# Patient Record
Sex: FEMALE | Race: WHITE | Employment: FULL TIME | URBAN - METROPOLITAN AREA
[De-identification: names, ages, dates, MRNs, and addresses within clinical notes are randomized per-mention and may not be internally consistent; named-entity substitution may affect disease eponyms.]

---

## 2019-04-13 ENCOUNTER — HOSPITAL ENCOUNTER (EMERGENCY)
Age: 49
Discharge: HOME | End: 2019-04-13
Payer: COMMERCIAL

## 2019-04-13 VITALS
HEART RATE: 77 BPM | WEIGHT: 180 LBS | DIASTOLIC BLOOD PRESSURE: 79 MMHG | RESPIRATION RATE: 14 BRPM | SYSTOLIC BLOOD PRESSURE: 114 MMHG | BODY MASS INDEX: 24.41 KG/M2 | OXYGEN SATURATION: 97 % | TEMPERATURE: 98.7 F

## 2019-04-13 DIAGNOSIS — J06.9 ACUTE UPPER RESPIRATORY INFECTION: Primary | ICD-10-CM

## 2019-04-13 PROCEDURE — 99212 OFFICE O/P EST SF 10 MIN: CPT

## 2019-04-13 RX ORDER — AMOXICILLIN AND CLAVULANATE POTASSIUM 875; 125 MG/1; MG/1
1 TABLET, FILM COATED ORAL 2 TIMES DAILY
Qty: 20 TABLET | Refills: 0 | Status: SHIPPED | OUTPATIENT
Start: 2019-04-13 | End: 2019-04-23

## 2019-04-13 RX ORDER — BENZONATATE 200 MG/1
200 CAPSULE ORAL 3 TIMES DAILY PRN
Qty: 21 CAPSULE | Refills: 0 | Status: SHIPPED | OUTPATIENT
Start: 2019-04-13 | End: 2019-05-04

## 2019-04-13 NOTE — ED PROVIDER NOTES
Department of Emergency Medicine   [address] [place] [address]  Provider Note  Admit Date/RoomTime: 4/13/2019  6:38 PM  Room: 04/04  Chief Complaint   Cough (productive cough); Nasal Congestion (PND, runny); and Headache    History of Present Illness   Source of history provided by:  patient. History/Exam Limitations: none. Darrell Barrow is a 54 y.o. old female who has a past medical history of:   Past Medical History:   Diagnosis Date    Diabetes mellitus ([address]    presents to the urgent care center by private vehicle, for runny nose, congestion, cough and sinus pressure, which began 1 week(s) prior to arrival.  Since onset the symptoms have been persistent. She said she sees an ENT she's had sinus surgery and this time is flared up she has pressure over her cheeks and forehead she has postnasal drip she has a cough. She's been taking over-the-counter Sudafed and allergy medicine and nothings relieving her symptoms. ROS    Pertinent positives and negatives are stated within HPI, all other systems reviewed and are negative. Past Surgical History:   Procedure Laterality Date    HYSTERECTOMY     Social History:  reports that she has never smoked. She does not have any smokeless tobacco history on file. She reports that she drinks alcohol. She reports that she does not use drugs. Family History: family history is not on file. Allergies: Patient has no known allergies. Physical Exam            ED Triage Vitals [04/13/19 1839]   BP Temp Temp src Pulse Resp SpO2 Height Weight   114/79 98.7 °F (37.1 °C) -- 77 14 97 % -- 180 lb (81.6 kg)      Oxygen Saturation Interpretation: Normal.    Constitutional:  Alert, development consistent with age. Ears:  External Ears: Bilateral normal.               TM's & External Canals: normal appearance, normal TMs bilaterally. Nose:   There is no discharge, swelling or lesions noted. Sinuses: mild Bilateral maxillary sinus tenderness. no Bilateral frontal sinus tenderness. Mouth:  normal tongue and buccal mucosa. Throat: no erythema or exudates noted. Teeth and gums normal..  Airway Patent. Neck/Lymphatics:  Neck Supple. There is no  preauricular, anterior cervical and posterior cervical node tenderness. Respiratory:   Breath sounds: Bilateral normal.  Lung sounds: normal.   CV:  Regular rate and rhythm, normal heart sounds, without pathological murmurs, ectopy, gallops, or rubs. GI:  Abdomen Soft, nontender, good bowel sounds. No firm or pulsatile mass. Integument:  Normal turgor. Warm, dry, without visible rash. Neurological:  Oriented. Motor functions intact. Lab / Imaging Results   (All laboratory and radiology results have been personally reviewed by myself)  Labs:  No results found for this visit on 04/13/19. Imaging: All Radiology results interpreted by Radiologist unless otherwise noted. No orders to display     ED Course / Medical Decision Making   Medications - No data to display       MDM:   Patient has been sick for one week with sinus congestion and drainage. Not over-the-counter medicines aren't helping she has a history of sinus infections and also sinus surgery. I did put her on Augmentin she said Tessalon does help her cough so I did order that for her she'll continue to take Sudafed and over-the-counter decongestants as needed and she has an appointment with her ENT coming up on Monday       She is not hypoxic. Patient is well appearing, non toxic and appropriate for outpatient management. Plan of Care: Normal progression of disease discussed. All questions answered. Explained the rationale for symptomatic treatment Instruction provided in the use of fluids, vaporizer, acetaminophen, and other OTC medication for symptom control. Extra fluids  Analgesics as needed, dose reviewed. Follow up as needed should symptoms fail to improve.      Counseling:   I have  spoken with the patient and discussed todays results, in addition to providing specific details for the plan of care and counseling regarding the diagnosis and prognosis. Questions are answered at this time and they are agreeable with the plan. Assessment      1. Acute upper respiratory infection      Plan   Discharge to home and advised to contact Emery Singer, [address] [facility] [address] [place] [address] [phone]      As needed   Patient condition is good    New Medications     New Prescriptions    AMOXICILLIN-CLAVULANATE (AUGMENTIN) 875-125 MG PER TABLET    Take 1 tablet by mouth 2 times daily for 10 days    BENZONATATE (TESSALON) 200 MG CAPSULE    Take 1 capsule by mouth 3 times daily as needed for Cough     Electronically signed by SHARLENE Bull CNP   DD: 4/13/19  **This report was transcribed using voice recognition software. Every effort was made to ensure accuracy; however, inadvertent computerized transcription errors may be present.   END OF ED PROVIDER NOTE     SHARLENE Bull CNP  04/13/19 [id number]

## 2019-07-28 ENCOUNTER — HOSPITAL ENCOUNTER (EMERGENCY)
Age: 49
Discharge: HOME | End: 2019-07-28
Payer: COMMERCIAL

## 2019-07-28 VITALS
TEMPERATURE: 97.7 F | BODY MASS INDEX: 24.38 KG/M2 | HEART RATE: 69 BPM | RESPIRATION RATE: 14 BRPM | OXYGEN SATURATION: 98 % | DIASTOLIC BLOOD PRESSURE: 74 MMHG | HEIGHT: 72 IN | WEIGHT: 180 LBS | SYSTOLIC BLOOD PRESSURE: 110 MMHG

## 2019-07-28 DIAGNOSIS — N30.00 ACUTE CYSTITIS WITHOUT HEMATURIA: Primary | ICD-10-CM

## 2019-07-28 LAB
BACTERIA: ABNORMAL /HPF
BILIRUBIN URINE: NEGATIVE
BLOOD, URINE: NEGATIVE
CLARITY: ABNORMAL
COLOR: ABNORMAL
GLUCOSE URINE: NEGATIVE MG/DL
KETONES, URINE: NEGATIVE MG/DL
LEUKOCYTE ESTERASE, URINE: NEGATIVE
NITRITE, URINE: POSITIVE
PH UA: 5.5 (ref 5–9)
PROTEIN UA: ABNORMAL MG/DL
RBC UA: ABNORMAL /HPF (ref 0–2)
SPECIFIC GRAVITY UA: 1.01 (ref 1–1.03)
UROBILINOGEN, URINE: 1 E.U./DL
WBC UA: ABNORMAL /HPF (ref 0–5)

## 2019-07-28 PROCEDURE — 87186 SC STD MICRODIL/AGAR DIL: CPT

## 2019-07-28 PROCEDURE — 81001 URINALYSIS AUTO W/SCOPE: CPT

## 2019-07-28 PROCEDURE — 87077 CULTURE AEROBIC IDENTIFY: CPT

## 2019-07-28 PROCEDURE — 87088 URINE BACTERIA CULTURE: CPT

## 2019-07-28 PROCEDURE — 99212 OFFICE O/P EST SF 10 MIN: CPT

## 2019-07-28 RX ORDER — PHENAZOPYRIDINE HYDROCHLORIDE 200 MG/1
200 TABLET, FILM COATED ORAL 3 TIMES DAILY PRN
Qty: 6 TABLET | Refills: 0 | Status: SHIPPED | OUTPATIENT
Start: 2019-07-28 | End: 2019-07-30

## 2019-07-28 RX ORDER — CEPHALEXIN 500 MG/1
500 CAPSULE ORAL 3 TIMES DAILY
Qty: 21 CAPSULE | Refills: 0 | Status: SHIPPED | OUTPATIENT
Start: 2019-07-28 | End: 2019-08-04

## 2019-07-28 RX ORDER — GLIPIZIDE 5 MG/1
5 TABLET ORAL
COMMUNITY

## 2019-07-28 ASSESSMENT — PAIN SCALES - GENERAL: PAINLEVEL_OUTOF10: 7

## 2019-07-28 ASSESSMENT — PAIN DESCRIPTION - DESCRIPTORS: DESCRIPTORS: BURNING

## 2019-07-28 ASSESSMENT — PAIN DESCRIPTION - LOCATION: LOCATION: PERINEUM

## 2019-07-30 LAB
ORGANISM: ABNORMAL
URINE CULTURE, ROUTINE: ABNORMAL
URINE CULTURE, ROUTINE: ABNORMAL

## 2019-09-22 ENCOUNTER — HOSPITAL ENCOUNTER (EMERGENCY)
Age: 49
Discharge: HOME | End: 2019-09-22
Payer: COMMERCIAL

## 2019-09-22 VITALS
BODY MASS INDEX: 24.41 KG/M2 | DIASTOLIC BLOOD PRESSURE: 73 MMHG | OXYGEN SATURATION: 99 % | WEIGHT: 180 LBS | SYSTOLIC BLOOD PRESSURE: 133 MMHG | TEMPERATURE: 98.2 F | HEART RATE: 76 BPM | RESPIRATION RATE: 16 BRPM

## 2019-09-22 DIAGNOSIS — J02.9 EXUDATIVE PHARYNGITIS: Primary | ICD-10-CM

## 2019-09-22 PROCEDURE — 99212 OFFICE O/P EST SF 10 MIN: CPT

## 2019-09-22 RX ORDER — HYDROCODONE BITARTRATE AND ACETAMINOPHEN 5; 325 MG/1; MG/1
1 TABLET ORAL EVERY 6 HOURS PRN
COMMUNITY
End: 2019-12-09

## 2019-09-22 RX ORDER — AMOXICILLIN 875 MG/1
875 TABLET, COATED ORAL 2 TIMES DAILY
Qty: 20 TABLET | Refills: 0 | Status: SHIPPED | OUTPATIENT
Start: 2019-09-22 | End: 2019-10-02

## 2019-09-22 RX ORDER — IBUPROFEN AND FAMOTIDINE 26.6; 8 MG/1; MG/1
TABLET, FILM COATED ORAL
COMMUNITY
End: 2021-08-08

## 2019-12-09 ENCOUNTER — HOSPITAL ENCOUNTER (EMERGENCY)
Age: 49
Discharge: HOME | End: 2019-12-09
Payer: COMMERCIAL

## 2019-12-09 VITALS
RESPIRATION RATE: 18 BRPM | HEART RATE: 100 BPM | TEMPERATURE: 99.2 F | OXYGEN SATURATION: 95 % | BODY MASS INDEX: 24.38 KG/M2 | DIASTOLIC BLOOD PRESSURE: 75 MMHG | WEIGHT: 180 LBS | HEIGHT: 72 IN | SYSTOLIC BLOOD PRESSURE: 100 MMHG

## 2019-12-09 DIAGNOSIS — R68.89 FLU-LIKE SYMPTOMS: Primary | ICD-10-CM

## 2019-12-09 LAB — STREP GRP A PCR: NEGATIVE

## 2019-12-09 PROCEDURE — 99212 OFFICE O/P EST SF 10 MIN: CPT

## 2019-12-09 PROCEDURE — 87880 STREP A ASSAY W/OPTIC: CPT

## 2019-12-09 RX ORDER — OSELTAMIVIR PHOSPHATE 75 MG/1
75 CAPSULE ORAL 2 TIMES DAILY
Qty: 10 CAPSULE | Refills: 0 | Status: SHIPPED | OUTPATIENT
Start: 2019-12-09 | End: 2019-12-14

## 2019-12-09 ASSESSMENT — PAIN SCALES - GENERAL: PAINLEVEL_OUTOF10: 8

## 2019-12-09 ASSESSMENT — PAIN DESCRIPTION - DESCRIPTORS: DESCRIPTORS: ACHING

## 2019-12-09 ASSESSMENT — PAIN DESCRIPTION - PAIN TYPE: TYPE: ACUTE PAIN

## 2019-12-09 ASSESSMENT — PAIN DESCRIPTION - PROGRESSION: CLINICAL_PROGRESSION: GRADUALLY WORSENING

## 2019-12-09 ASSESSMENT — PAIN DESCRIPTION - LOCATION: LOCATION: GENERALIZED

## 2019-12-09 ASSESSMENT — PAIN DESCRIPTION - FREQUENCY: FREQUENCY: CONTINUOUS

## 2019-12-09 ASSESSMENT — PAIN DESCRIPTION - ONSET: ONSET: ON-GOING

## 2019-12-17 ENCOUNTER — HOSPITAL ENCOUNTER (EMERGENCY)
Age: 49
Discharge: HOME | End: 2019-12-17
Payer: COMMERCIAL

## 2019-12-17 ENCOUNTER — APPOINTMENT (OUTPATIENT)
Dept: GENERAL RADIOLOGY | Age: 49
End: 2019-12-17
Payer: COMMERCIAL

## 2019-12-17 VITALS
RESPIRATION RATE: 16 BRPM | DIASTOLIC BLOOD PRESSURE: 84 MMHG | BODY MASS INDEX: 24.41 KG/M2 | WEIGHT: 180 LBS | SYSTOLIC BLOOD PRESSURE: 135 MMHG | TEMPERATURE: 98.2 F | OXYGEN SATURATION: 98 % | HEART RATE: 79 BPM

## 2019-12-17 DIAGNOSIS — J18.9 PNEUMONIA DUE TO ORGANISM: Primary | ICD-10-CM

## 2019-12-17 PROCEDURE — 71046 X-RAY EXAM CHEST 2 VIEWS: CPT

## 2019-12-17 PROCEDURE — 99212 OFFICE O/P EST SF 10 MIN: CPT

## 2019-12-17 RX ORDER — CEFDINIR 300 MG/1
300 CAPSULE ORAL 2 TIMES DAILY
Qty: 20 CAPSULE | Refills: 0 | Status: SHIPPED | OUTPATIENT
Start: 2019-12-17 | End: 2019-12-27

## 2019-12-17 RX ORDER — BENZONATATE 200 MG/1
200 CAPSULE ORAL 3 TIMES DAILY PRN
Qty: 21 CAPSULE | Refills: 0 | Status: SHIPPED | OUTPATIENT
Start: 2019-12-17 | End: 2019-12-24

## 2019-12-17 RX ORDER — AZITHROMYCIN 250 MG/1
TABLET, FILM COATED ORAL
Qty: 6 TABLET | Refills: 0 | Status: SHIPPED | OUTPATIENT
Start: 2019-12-17 | End: 2019-12-27

## 2020-09-07 ENCOUNTER — HOSPITAL ENCOUNTER (OUTPATIENT)
Age: 50
Discharge: HOME | End: 2020-09-07
Payer: COMMERCIAL

## 2020-09-07 LAB
ALBUMIN SERPL-MCNC: 4.4 G/DL (ref 3.5–5.2)
ALP BLD-CCNC: 72 U/L (ref 35–104)
ALT SERPL-CCNC: 9 U/L (ref 0–32)
ANION GAP SERPL CALCULATED.3IONS-SCNC: 11 MMOL/L (ref 7–16)
AST SERPL-CCNC: 15 U/L (ref 0–31)
BASOPHILS ABSOLUTE: 0.04 E9/L (ref 0–0.2)
BASOPHILS RELATIVE PERCENT: 0.9 % (ref 0–2)
BILIRUB SERPL-MCNC: 0.9 MG/DL (ref 0–1.2)
BUN BLDV-MCNC: 13 MG/DL (ref 6–20)
CALCIUM SERPL-MCNC: 9.3 MG/DL (ref 8.6–10.2)
CHLORIDE BLD-SCNC: 102 MMOL/L (ref 98–107)
CHOLESTEROL, FASTING: 171 MG/DL (ref 0–199)
CO2: 27 MMOL/L (ref 22–29)
CREAT SERPL-MCNC: 0.9 MG/DL (ref 0.5–1)
CREATININE URINE: 34 MG/DL (ref 29–226)
EOSINOPHILS ABSOLUTE: 0.1 E9/L (ref 0.05–0.5)
EOSINOPHILS RELATIVE PERCENT: 2.3 % (ref 0–6)
GFR AFRICAN AMERICAN: >60
GFR NON-AFRICAN AMERICAN: >60 ML/MIN/1.73
GLUCOSE BLD-MCNC: 140 MG/DL (ref 74–99)
HCT VFR BLD CALC: 38.2 % (ref 34–48)
HDLC SERPL-MCNC: 60 MG/DL
HEMOGLOBIN: 12.9 G/DL (ref 11.5–15.5)
IMMATURE GRANULOCYTES #: 0.01 E9/L
IMMATURE GRANULOCYTES %: 0.2 % (ref 0–5)
LDL CHOLESTEROL CALCULATED: 90 MG/DL (ref 0–99)
LYMPHOCYTES ABSOLUTE: 1.38 E9/L (ref 1.5–4)
LYMPHOCYTES RELATIVE PERCENT: 31.7 % (ref 20–42)
MCH RBC QN AUTO: 33.2 PG (ref 26–35)
MCHC RBC AUTO-ENTMCNC: 33.8 % (ref 32–34.5)
MCV RBC AUTO: 98.2 FL (ref 80–99.9)
MICROALBUMIN UR-MCNC: <12 MG/L
MICROALBUMIN/CREAT UR-RTO: ABNORMAL (ref 0–30)
MONOCYTES ABSOLUTE: 0.28 E9/L (ref 0.1–0.95)
MONOCYTES RELATIVE PERCENT: 6.4 % (ref 2–12)
NEUTROPHILS ABSOLUTE: 2.55 E9/L (ref 1.8–7.3)
NEUTROPHILS RELATIVE PERCENT: 58.5 % (ref 43–80)
PDW BLD-RTO: 11.8 FL (ref 11.5–15)
PLATELET # BLD: 258 E9/L (ref 130–450)
PMV BLD AUTO: 9.3 FL (ref 7–12)
POTASSIUM SERPL-SCNC: 4.5 MMOL/L (ref 3.5–5)
RBC # BLD: 3.89 E12/L (ref 3.5–5.5)
SODIUM BLD-SCNC: 140 MMOL/L (ref 132–146)
T4 FREE: 1.22 NG/DL (ref 0.93–1.7)
TOTAL PROTEIN: 7.4 G/DL (ref 6.4–8.3)
TRIGLYCERIDE, FASTING: 107 MG/DL (ref 0–149)
TSH SERPL DL<=0.05 MIU/L-ACNC: 2.16 UIU/ML (ref 0.27–4.2)
VITAMIN D 25-HYDROXY: 45 NG/ML (ref 30–100)
VLDLC SERPL CALC-MCNC: 21 MG/DL
WBC # BLD: 4.4 E9/L (ref 4.5–11.5)

## 2020-09-07 PROCEDURE — 85025 COMPLETE CBC W/AUTO DIFF WBC: CPT

## 2020-09-07 PROCEDURE — 84439 ASSAY OF FREE THYROXINE: CPT

## 2020-09-07 PROCEDURE — 82044 UR ALBUMIN SEMIQUANTITATIVE: CPT

## 2020-09-07 PROCEDURE — 80061 LIPID PANEL: CPT

## 2020-09-07 PROCEDURE — 82570 ASSAY OF URINE CREATININE: CPT

## 2020-09-07 PROCEDURE — 84443 ASSAY THYROID STIM HORMONE: CPT

## 2020-09-07 PROCEDURE — 82306 VITAMIN D 25 HYDROXY: CPT

## 2020-09-07 PROCEDURE — 80053 COMPREHEN METABOLIC PANEL: CPT

## 2020-09-07 PROCEDURE — 36415 COLL VENOUS BLD VENIPUNCTURE: CPT

## 2021-08-08 ENCOUNTER — HOSPITAL ENCOUNTER (EMERGENCY)
Age: 51
Discharge: HOME | End: 2021-08-08
Payer: COMMERCIAL

## 2021-08-08 VITALS
DIASTOLIC BLOOD PRESSURE: 74 MMHG | BODY MASS INDEX: 23.46 KG/M2 | SYSTOLIC BLOOD PRESSURE: 115 MMHG | TEMPERATURE: 97.7 F | OXYGEN SATURATION: 99 % | HEART RATE: 65 BPM | RESPIRATION RATE: 16 BRPM | WEIGHT: 173 LBS

## 2021-08-08 DIAGNOSIS — N30.01 ACUTE CYSTITIS WITH HEMATURIA: Primary | ICD-10-CM

## 2021-08-08 LAB
BACTERIA: ABNORMAL /HPF
BILIRUBIN URINE: NEGATIVE
BLOOD, URINE: ABNORMAL
CLARITY: CLEAR
COLOR: YELLOW
EPITHELIAL CELLS, UA: ABNORMAL /HPF
GLUCOSE URINE: 500 MG/DL
KETONES, URINE: NEGATIVE MG/DL
LEUKOCYTE ESTERASE, URINE: NEGATIVE
NITRITE, URINE: NEGATIVE
PH UA: 5.5 (ref 5–9)
PROTEIN UA: NEGATIVE MG/DL
RBC UA: ABNORMAL /HPF (ref 0–2)
SPECIFIC GRAVITY UA: 1.02 (ref 1–1.03)
UROBILINOGEN, URINE: 1 E.U./DL
WBC UA: ABNORMAL /HPF (ref 0–5)

## 2021-08-08 PROCEDURE — 99211 OFF/OP EST MAY X REQ PHY/QHP: CPT

## 2021-08-08 PROCEDURE — 81001 URINALYSIS AUTO W/SCOPE: CPT

## 2021-08-08 PROCEDURE — 6370000000 HC RX 637 (ALT 250 FOR IP): Performed by: PHYSICIAN ASSISTANT

## 2021-08-08 PROCEDURE — 87088 URINE BACTERIA CULTURE: CPT

## 2021-08-08 RX ORDER — CEPHALEXIN 500 MG/1
500 CAPSULE ORAL ONCE
Status: COMPLETED | OUTPATIENT
Start: 2021-08-08 | End: 2021-08-08

## 2021-08-08 RX ORDER — CEPHALEXIN 500 MG/1
500 CAPSULE ORAL 3 TIMES DAILY
Qty: 15 CAPSULE | Refills: 0 | Status: SHIPPED | OUTPATIENT
Start: 2021-08-08 | End: 2021-08-11

## 2021-08-08 RX ADMIN — CEPHALEXIN 500 MG: 500 CAPSULE ORAL at 19:50

## 2021-08-08 NOTE — ED PROVIDER NOTES
[address] [facility]  Department of Emergency Medicine  UC Encounter Note  21   7:09 PM EDT      NAME: Erasmo Varghese  :  1970  MRN:  [id number]    Chief Complaint: Urinary Tract Infection (Pt c/o bladder pressure and burning when urinating since this afternoon)      This is a 61-year-old female the presents to urgent care complaining of urinary symptoms throughout the day. She thinks she may have a urinary tract infection. She does complain of some bladder pressure and burning. She denies any fevers or chills or back pain. On first contact patient she appears to be in no acute distress. Review of Systems  Pertinent positives and negatives are stated within HPI, all other systems reviewed and are negative. Physical Exam  Vitals and nursing note reviewed. Constitutional:       Appearance: She is well-developed. HENT:      Head: Normocephalic and atraumatic. Right Ear: Hearing and external ear normal.      Left Ear: Hearing and external ear normal.      Nose: Nose normal.      Mouth/Throat:      Pharynx: Uvula midline. Eyes:      General: Lids are normal.      Conjunctiva/sclera: Conjunctivae normal.      Pupils: Pupils are equal, round, and reactive to light. Cardiovascular:      Rate and Rhythm: Normal rate and regular rhythm. Heart sounds: Normal heart sounds. No murmur heard. Pulmonary:      Effort: Pulmonary effort is normal.      Breath sounds: Normal breath sounds. Abdominal:      General: Bowel sounds are normal.      Palpations: Abdomen is soft. Abdomen is not rigid. Tenderness: There is no abdominal tenderness. There is no right CVA tenderness, left CVA tenderness, guarding or rebound. Musculoskeletal:      Cervical back: Normal range of motion and neck supple. Skin:     General: Skin is warm and dry. Findings: No abrasion or rash. Neurological:      Mental Status: She is alert and oriented to person, place, and time. GCS: GCS eye subscore is 4. GCS verbal subscore is 5. GCS motor subscore is 6. Cranial Nerves: Cranial nerves are intact. No cranial nerve deficit. Sensory: Sensation is intact. No sensory deficit. Motor: Motor function is intact. Coordination: Coordination is intact. Coordination normal.      Gait: Gait is intact. Gait normal.         Procedures    MDM  Number of Diagnoses or Management Options  Acute cystitis with hematuria  Diagnosis management comments: Lab tests were reviewed. She is having some urinary symptoms. I did want her to follow-up with her primary care provider to check on the urine culture results. In the meantime I will place her on an antibiotic for the next several days. Also I suggested she take Azo. Instructions given. If symptoms worsen go to the emergency department.           --------------------------------------------- PAST HISTORY ---------------------------------------------  Past Medical History:  has a past medical history of Diabetes mellitus ([facility] [address] Past Surgical History:  has a past surgical history that includes Hysterectomy and shoulder surgery (Left, 08/26/2019). Social History:  reports that she has never smoked. She has never used smokeless tobacco. She reports current alcohol use. She reports that she does not use drugs. Family History: family history is not on file. The patients home medications have been reviewed. Allergies: Patient has no known allergies.     -------------------------------------------------- RESULTS -------------------------------------------------  Results for orders placed or performed during the hospital encounter of 08/08/21   Urinalysis   Result Value Ref Range    Color, UA Yellow Straw/Yellow    Clarity, UA Clear Clear    Glucose, Ur 500 (A) Negative mg/dL    Bilirubin Urine Negative Negative    Ketones, Urine Negative Negative mg/dL    Specific Gravity, UA 1.025 1.005 - 1.030    Blood, Urine TRACE (A) Negative    pH, UA 5.5 5.0 - 9.0    Protein, UA Negative Negative mg/dL    Urobilinogen, Urine 1.0 <2.0 E.U./dL    Nitrite, Urine Negative Negative    Leukocyte Esterase, Urine Negative Negative   Microscopic Urinalysis   Result Value Ref Range    WBC, UA NONE 0 - 5 /HPF    RBC, UA 1-3 0 - 2 /HPF    Epithelial Cells, UA FEW /HPF    Bacteria, UA FEW (A) None Seen /HPF     No orders to display       ------------------------- NURSING NOTES AND VITALS REVIEWED ---------------------------   The nursing notes within the ED encounter and vital signs as below have been reviewed. /74   Pulse 65   Temp 97.7 °F (36.5 °C) (Infrared)   Resp 16   Wt 173 lb (78.5 kg)   SpO2 99%   BMI 23.46 kg/m²   Oxygen Saturation Interpretation: Normal      ------------------------------------------ PROGRESS NOTES ------------------------------------------   I have spoken with the patient and discussed todays results, in addition to providing specific details for the plan of care and counseling regarding the diagnosis and prognosis. Their questions are answered at this time and they are agreeable with the plan.      --------------------------------- ADDITIONAL PROVIDER NOTES ---------------------------------     This patient is stable for discharge. I have shared the specific conditions for return, as well as the importance of follow-up. * NOTE: This report was transcribed using voice recognition software. Every effort was made to ensure accuracy; however, inadvertent computerized transcription errors may be present.    --------------------------------- IMPRESSION AND DISPOSITION ---------------------------------    IMPRESSION  1.  Acute cystitis with hematuria        DISPOSITION  Disposition: Discharge to home  Patient condition is good       Jazmyn Hill PA-C  08/08/21 1951

## 2021-08-11 ENCOUNTER — APPOINTMENT (OUTPATIENT)
Dept: GENERAL RADIOLOGY | Age: 51
End: 2021-08-11
Payer: COMMERCIAL

## 2021-08-11 ENCOUNTER — APPOINTMENT (OUTPATIENT)
Dept: CT IMAGING | Age: 51
End: 2021-08-11
Payer: COMMERCIAL

## 2021-08-11 ENCOUNTER — HOSPITAL ENCOUNTER (EMERGENCY)
Age: 51
Discharge: HOME | End: 2021-08-11
Attending: EMERGENCY MEDICINE
Payer: COMMERCIAL

## 2021-08-11 VITALS
SYSTOLIC BLOOD PRESSURE: 112 MMHG | HEART RATE: 76 BPM | BODY MASS INDEX: 23.43 KG/M2 | WEIGHT: 173 LBS | HEIGHT: 72 IN | RESPIRATION RATE: 16 BRPM | TEMPERATURE: 98.1 F | OXYGEN SATURATION: 98 % | DIASTOLIC BLOOD PRESSURE: 67 MMHG

## 2021-08-11 DIAGNOSIS — R55 SYNCOPE AND COLLAPSE: Primary | ICD-10-CM

## 2021-08-11 DIAGNOSIS — N39.0 UTI (URINARY TRACT INFECTION), BACTERIAL: ICD-10-CM

## 2021-08-11 DIAGNOSIS — A49.9 UTI (URINARY TRACT INFECTION), BACTERIAL: ICD-10-CM

## 2021-08-11 LAB
ANION GAP SERPL CALCULATED.3IONS-SCNC: 8 MMOL/L (ref 7–16)
BACTERIA: ABNORMAL /HPF
BASOPHILS ABSOLUTE: 0.04 E9/L (ref 0–0.2)
BASOPHILS RELATIVE PERCENT: 0.7 % (ref 0–2)
BILIRUBIN URINE: ABNORMAL
BLOOD, URINE: NEGATIVE
BUN BLDV-MCNC: 15 MG/DL (ref 6–20)
CALCIUM SERPL-MCNC: 9.5 MG/DL (ref 8.6–10.2)
CHLORIDE BLD-SCNC: 105 MMOL/L (ref 98–107)
CHP ED QC CHECK: NORMAL
CLARITY: CLEAR
CO2: 27 MMOL/L (ref 22–29)
COLOR: ABNORMAL
CREAT SERPL-MCNC: 1 MG/DL (ref 0.5–1)
CRYSTALS, UA: ABNORMAL /HPF
EKG ATRIAL RATE: 76 BPM
EKG P AXIS: 73 DEGREES
EKG P-R INTERVAL: 150 MS
EKG Q-T INTERVAL: 394 MS
EKG QRS DURATION: 92 MS
EKG QTC CALCULATION (BAZETT): 443 MS
EKG R AXIS: 4 DEGREES
EKG T AXIS: 64 DEGREES
EKG VENTRICULAR RATE: 76 BPM
EOSINOPHILS ABSOLUTE: 0.06 E9/L (ref 0.05–0.5)
EOSINOPHILS RELATIVE PERCENT: 1.1 % (ref 0–6)
EPITHELIAL CELLS, UA: ABNORMAL /HPF
GFR AFRICAN AMERICAN: >60
GFR NON-AFRICAN AMERICAN: 59 ML/MIN/1.73
GLUCOSE BLD-MCNC: 137 MG/DL
GLUCOSE BLD-MCNC: 148 MG/DL (ref 74–99)
GLUCOSE URINE: >=1000 MG/DL
HCT VFR BLD CALC: 41.7 % (ref 34–48)
HEMOGLOBIN: 14 G/DL (ref 11.5–15.5)
IMMATURE GRANULOCYTES #: 0.01 E9/L
IMMATURE GRANULOCYTES %: 0.2 % (ref 0–5)
KETONES, URINE: ABNORMAL MG/DL
LEUKOCYTE ESTERASE, URINE: NEGATIVE
LYMPHOCYTES ABSOLUTE: 1.4 E9/L (ref 1.5–4)
LYMPHOCYTES RELATIVE PERCENT: 25 % (ref 20–42)
MCH RBC QN AUTO: 33.1 PG (ref 26–35)
MCHC RBC AUTO-ENTMCNC: 33.6 % (ref 32–34.5)
MCV RBC AUTO: 98.6 FL (ref 80–99.9)
METER GLUCOSE: 137 MG/DL (ref 74–99)
MONOCYTES ABSOLUTE: 0.44 E9/L (ref 0.1–0.95)
MONOCYTES RELATIVE PERCENT: 7.9 % (ref 2–12)
NEUTROPHILS ABSOLUTE: 3.65 E9/L (ref 1.8–7.3)
NEUTROPHILS RELATIVE PERCENT: 65.1 % (ref 43–80)
NITRITE, URINE: POSITIVE
PDW BLD-RTO: 11.8 FL (ref 11.5–15)
PH UA: 5 (ref 5–9)
PLATELET # BLD: 284 E9/L (ref 130–450)
PMV BLD AUTO: 9.7 FL (ref 7–12)
POTASSIUM REFLEX MAGNESIUM: 4.4 MMOL/L (ref 3.5–5)
PROTEIN UA: ABNORMAL MG/DL
RBC # BLD: 4.23 E12/L (ref 3.5–5.5)
RBC UA: ABNORMAL /HPF (ref 0–2)
SODIUM BLD-SCNC: 140 MMOL/L (ref 132–146)
SPECIFIC GRAVITY UA: 1.02 (ref 1–1.03)
TROPONIN, HIGH SENSITIVITY: <6 NG/L (ref 0–9)
URINE CULTURE, ROUTINE: NORMAL
UROBILINOGEN, URINE: 1 E.U./DL
WBC # BLD: 5.6 E9/L (ref 4.5–11.5)
WBC UA: ABNORMAL /HPF (ref 0–5)

## 2021-08-11 PROCEDURE — 71045 X-RAY EXAM CHEST 1 VIEW: CPT

## 2021-08-11 PROCEDURE — 80048 BASIC METABOLIC PNL TOTAL CA: CPT

## 2021-08-11 PROCEDURE — 82962 GLUCOSE BLOOD TEST: CPT

## 2021-08-11 PROCEDURE — 85025 COMPLETE CBC W/AUTO DIFF WBC: CPT

## 2021-08-11 PROCEDURE — 84484 ASSAY OF TROPONIN QUANT: CPT

## 2021-08-11 PROCEDURE — 6370000000 HC RX 637 (ALT 250 FOR IP): Performed by: STUDENT IN AN ORGANIZED HEALTH CARE EDUCATION/TRAINING PROGRAM

## 2021-08-11 PROCEDURE — 81001 URINALYSIS AUTO W/SCOPE: CPT

## 2021-08-11 PROCEDURE — 70450 CT HEAD/BRAIN W/O DYE: CPT

## 2021-08-11 PROCEDURE — 93010 ELECTROCARDIOGRAM REPORT: CPT | Performed by: INTERNAL MEDICINE

## 2021-08-11 PROCEDURE — 36415 COLL VENOUS BLD VENIPUNCTURE: CPT

## 2021-08-11 PROCEDURE — 93005 ELECTROCARDIOGRAM TRACING: CPT | Performed by: STUDENT IN AN ORGANIZED HEALTH CARE EDUCATION/TRAINING PROGRAM

## 2021-08-11 PROCEDURE — 2580000003 HC RX 258: Performed by: STUDENT IN AN ORGANIZED HEALTH CARE EDUCATION/TRAINING PROGRAM

## 2021-08-11 PROCEDURE — 99284 EMERGENCY DEPT VISIT MOD MDM: CPT

## 2021-08-11 PROCEDURE — 72125 CT NECK SPINE W/O DYE: CPT

## 2021-08-11 RX ORDER — CEFDINIR 300 MG/1
300 CAPSULE ORAL ONCE
Status: COMPLETED | OUTPATIENT
Start: 2021-08-11 | End: 2021-08-11

## 2021-08-11 RX ORDER — 0.9 % SODIUM CHLORIDE 0.9 %
1000 INTRAVENOUS SOLUTION INTRAVENOUS ONCE
Status: COMPLETED | OUTPATIENT
Start: 2021-08-11 | End: 2021-08-11

## 2021-08-11 RX ORDER — CEFDINIR 300 MG/1
300 CAPSULE ORAL 2 TIMES DAILY
Qty: 20 CAPSULE | Refills: 0 | Status: SHIPPED | OUTPATIENT
Start: 2021-08-11 | End: 2021-08-21

## 2021-08-11 RX ORDER — PHENAZOPYRIDINE HYDROCHLORIDE 100 MG/1
200 TABLET, FILM COATED ORAL ONCE
Status: COMPLETED | OUTPATIENT
Start: 2021-08-11 | End: 2021-08-11

## 2021-08-11 RX ORDER — EMPAGLIFLOZIN 10 MG/1
10 TABLET, FILM COATED ORAL DAILY
COMMUNITY

## 2021-08-11 RX ORDER — PHENAZOPYRIDINE HYDROCHLORIDE 100 MG/1
100 TABLET, FILM COATED ORAL 3 TIMES DAILY PRN
Qty: 9 TABLET | Refills: 0 | Status: SHIPPED | OUTPATIENT
Start: 2021-08-11 | End: 2021-08-14

## 2021-08-11 RX ADMIN — CEFDINIR 300 MG: 300 CAPSULE ORAL at 09:49

## 2021-08-11 RX ADMIN — SODIUM CHLORIDE 1000 ML: 9 INJECTION, SOLUTION INTRAVENOUS at 08:39

## 2021-08-11 RX ADMIN — SODIUM CHLORIDE 1000 ML: 9 INJECTION, SOLUTION INTRAVENOUS at 07:06

## 2021-08-11 RX ADMIN — PHENAZOPYRIDINE HYDROCHLORIDE 200 MG: 100 TABLET ORAL at 09:49

## 2021-08-11 ASSESSMENT — ENCOUNTER SYMPTOMS
WHEEZING: 0
RHINORRHEA: 0
EYE PAIN: 0
BACK PAIN: 0
TROUBLE SWALLOWING: 0
PHOTOPHOBIA: 0
SORE THROAT: 0
VOMITING: 0
APNEA: 0
DIARRHEA: 0
NAUSEA: 0
CHEST TIGHTNESS: 0
SHORTNESS OF BREATH: 0
CONSTIPATION: 0
COUGH: 0
ABDOMINAL PAIN: 0

## 2021-08-11 ASSESSMENT — PAIN SCALES - GENERAL: PAINLEVEL_OUTOF10: 3

## 2021-08-11 NOTE — ED PROVIDER NOTES
[address]      Pt Name: Apoorva Calero  MRN: [id number]  [place] 1970  Date of evaluation: 8/11/2021      CHIEF COMPLAINT       Chief Complaint   Patient presents with    Loss of Consciousness     Pt reports 2 episodes of syncope today. Dx with UTI sunday that has not improved.  Fall     C/o back pain. Pt admits to falling down stairs        HPI  Apoorva Calero is a 54 y.o. female with history of diabetes who presents to the emergency department after syncopal episode. The patient recently treated for urinary tract infection since Sunday, 4 days ago with Keflex. She states that last night started feeling more fatigued. This morning she felt lightheaded. She states that she went to go down the stairs and passed out falling down several stairs. She woke up on the ground. She did not went to the kitchen when she felt lightheaded and passed out briefly again. She is unsure if she hit her head. She is currently complaining of fatigue and lightheadedness. She describes symptoms as moderate, intermittent, nothing makes it better or worse. She has been eating and drinking normally. She denies any fevers, chills, chest pain or shortness of breath. She does states she continues to have dysuria and does not feel like the antibiotic is helping her UTI. She is on medication for diabetes but no other medications. She has no other known health problems. Except as noted above the remainder of the review of systems was reviewed and negative. Review of Systems   Constitutional: Negative for chills, diaphoresis, fatigue and fever. HENT: Negative for rhinorrhea, sore throat and trouble swallowing. Eyes: Negative for photophobia and pain. Respiratory: Negative for apnea, cough, chest tightness, shortness of breath and wheezing. Cardiovascular: Negative for chest pain, palpitations and leg swelling. Gastrointestinal: Negative for abdominal pain, constipation, diarrhea, nausea and vomiting. Endocrine: Negative for polyuria. Genitourinary: Positive for dysuria. Negative for difficulty urinating. Musculoskeletal: Negative for back pain, neck pain and neck stiffness. Skin: Negative for pallor and rash. Neurological: Positive for syncope and light-headedness. Negative for dizziness, speech difficulty, weakness and headaches. Psychiatric/Behavioral: Negative for confusion. The patient is not nervous/anxious. Physical Exam  Vitals and nursing note reviewed. Constitutional:       General: She is not in acute distress. Appearance: She is well-developed. Comments: Awake and alert. Sitting in the gurney in no obvious distress. HENT:      Head: Normocephalic and atraumatic. Right Ear: External ear normal.      Left Ear: External ear normal.      Mouth/Throat:      Mouth: Mucous membranes are moist.   Eyes:      General: No scleral icterus. Pupils: Pupils are equal, round, and reactive to light. Cardiovascular:      Rate and Rhythm: Normal rate and regular rhythm. Heart sounds: No murmur heard. Comments: 2+ radial and dorsal pedis pulses bilaterally  Pulmonary:      Effort: Pulmonary effort is normal. No respiratory distress. Breath sounds: Normal breath sounds. No wheezing. Abdominal:      Palpations: Abdomen is soft. Tenderness: There is no abdominal tenderness. There is no guarding or rebound. Musculoskeletal:         General: No tenderness or deformity. Normal range of motion. Cervical back: Normal range of motion and neck supple. Right lower leg: No edema. Left lower leg: No edema. Skin:     General: Skin is warm and dry. Capillary Refill: Capillary refill takes less than 2 seconds. Neurological:      General: No focal deficit present. Mental Status: She is alert and oriented to person, place, and time.       Cranial Nerves: No cranial nerve deficit. Sensory: No sensory deficit. Motor: No weakness or abnormal muscle tone. Psychiatric:         Mood and Affect: Mood normal.         Behavior: Behavior normal.          Procedures     MDM   This is a 24-year-old female with history of diabetes who presents to the emergency department after syncope. In the emergency department the patient is awake and alert, hemodynamically stable, afebrile and in no respiratory distress. Orthostatics mildly positive. Administered to his normal saline IV. Patient able to ambulate without difficulty. Blood pressure remained stable. Urinalysis consistent with possible urinary tract infection given his ongoing symptoms patient started on Omnicef. Instructed to stop the Keflex she had been on. Urine culture sent. Metabolic panel showed normal electrolytes normal renal function. Patient not anemic. EKG showed normal sinus rhythm. Patient on telemetry. No signs of underlying dysrhythmia causing her symptoms. Patient ambulating without difficulty. Discussed plan for discharge home as well as return precautions and the patient understands and agrees to the plan. ED Course as of Aug 11 1110   Wed Aug 11, 2021   6506   ATTENDING PROVIDER ATTESTATION:     I have personally performed and/or participated in the history, exam, medical decision making, and procedures and agree with all pertinent clinical information unless otherwise noted. I have also reviewed and agree with the past medical, family and social history unless otherwise noted. I have discussed this patient in detail with the resident and provided the instruction and education regarding the evidence-based evaluation and treatment of syncope. History: patient states she had multiple syncopal episodes this morning. She is currently being treated for a UTI and has had no improvement in her symptoms.   She is a diabetic and states her BGL is normal.    My findings: Garrett Bal is a 54 y.o. female whom is in no distress. Physical exam reveals well appearing. Heart RRR, lungs CTA, abdomen is soft and mildly tender in the suprapubic area. NO CVA tenderness. My plan: Symptomatic and supportive care. Will evaluate with labs and imaging. Electronically signed by Wilmer Cha DO on 8/11/21 at 7:34 AM EDT          [JS]   7215 Examined patient. She will much better. Discussed palpitations at home as well as return precautions. Urine culture sent. Patient started on Omnicef and given prescription. Pyridium. Discussed lab and imaging results with patient. [LM]      ED Course User Index  [JS] Wilmer Cha DO  [LM] Margarita Barfield DO           ED Course as of Aug 11 1112   Wed Aug 11, 2021   0552   ATTENDING PROVIDER ATTESTATION:     I have personally performed and/or participated in the history, exam, medical decision making, and procedures and agree with all pertinent clinical information unless otherwise noted. I have also reviewed and agree with the past medical, family and social history unless otherwise noted. I have discussed this patient in detail with the resident and provided the instruction and education regarding the evidence-based evaluation and treatment of syncope. History: patient states she had multiple syncopal episodes this morning. She is currently being treated for a UTI and has had no improvement in her symptoms. She is a diabetic and states her BGL is normal.    My findings: Garrett Bal is a 54 y.o. female whom is in no distress. Physical exam reveals well appearing. Heart RRR, lungs CTA, abdomen is soft and mildly tender in the suprapubic area. NO CVA tenderness. My plan: Symptomatic and supportive care. Will evaluate with labs and imaging. Electronically signed by Wilmer Cha DO on 8/11/21 at 7:34 AM EDT          [JS]   3088 Examined patient. She will much better.   Discussed palpitations at home as well as return precautions. Urine culture sent. Patient started on Omnicef and given prescription. Pyridium. Discussed lab and imaging results with patient. [LM]      ED Course User Index  [JS] Camila Patricia DO  [LM] Lara DO Carlitos       --------------------------------------------- PAST HISTORY ---------------------------------------------  Past Medical History:  has a past medical history of Diabetes mellitus ([facility] [address] Past Surgical History:  has a past surgical history that includes Hysterectomy and shoulder surgery (Left, 08/26/2019). Social History:  reports that she has never smoked. She has never used smokeless tobacco. She reports current alcohol use. She reports that she does not use drugs. Family History: family history is not on file. The patients home medications have been reviewed. Allergies: Patient has no known allergies.     -------------------------------------------------- RESULTS -------------------------------------------------  Labs:  Results for orders placed or performed during the hospital encounter of 08/11/21   CBC Auto Differential   Result Value Ref Range    WBC 5.6 4.5 - 11.5 E9/L    RBC 4.23 3.50 - 5.50 E12/L    Hemoglobin 14.0 11.5 - 15.5 g/dL    Hematocrit 41.7 34.0 - 48.0 %    MCV 98.6 80.0 - 99.9 fL    MCH 33.1 26.0 - 35.0 pg    MCHC 33.6 32.0 - 34.5 %    RDW 11.8 11.5 - 15.0 fL    Platelets 955 238 - 789 E9/L    MPV 9.7 7.0 - 12.0 fL    Neutrophils % 65.1 43.0 - 80.0 %    Immature Granulocytes % 0.2 0.0 - 5.0 %    Lymphocytes % 25.0 20.0 - 42.0 %    Monocytes % 7.9 2.0 - 12.0 %    Eosinophils % 1.1 0.0 - 6.0 %    Basophils % 0.7 0.0 - 2.0 %    Neutrophils Absolute 3.65 1.80 - 7.30 E9/L    Immature Granulocytes # 0.01 E9/L    Lymphocytes Absolute 1.40 (L) 1.50 - 4.00 E9/L    Monocytes Absolute 0.44 0.10 - 0.95 E9/L    Eosinophils Absolute 0.06 0.05 - 0.50 E9/L    Basophils Absolute 0.04 0.00 - 0.20 P3/B   Basic Metabolic Panel w/ Reflex to MG   Result Value Ref Range    Sodium 140 132 - 146 mmol/L    Potassium reflex Magnesium 4.4 3.5 - 5.0 mmol/L    Chloride 105 98 - 107 mmol/L    CO2 27 22 - 29 mmol/L    Anion Gap 8 7 - 16 mmol/L    Glucose 148 (H) 74 - 99 mg/dL    BUN 15 6 - 20 mg/dL    CREATININE 1.0 0.5 - 1.0 mg/dL    GFR Non-African American 59 >=60 mL/min/1.73    GFR African American >60     Calcium 9.5 8.6 - 10.2 mg/dL   Troponin   Result Value Ref Range    Troponin, High Sensitivity <6 0 - 9 ng/L   Urinalysis, reflex to microscopic   Result Value Ref Range    Color, UA ORANGE (A) Straw/Yellow    Clarity, UA Clear Clear    Glucose, Ur >=1000 (A) Negative mg/dL    Bilirubin Urine SMALL (A) Negative    Ketones, Urine TRACE (A) Negative mg/dL    Specific Gravity, UA 1.020 1.005 - 1.030    Blood, Urine Negative Negative    pH, UA 5.0 5.0 - 9.0    Protein, UA TRACE Negative mg/dL    Urobilinogen, Urine 1.0 <2.0 E.U./dL    Nitrite, Urine POSITIVE (A) Negative    Leukocyte Esterase, Urine Negative Negative   Microscopic Urinalysis   Result Value Ref Range    WBC, UA NONE 0 - 5 /HPF    RBC, UA 0-1 0 - 2 /HPF    Epithelial Cells, UA FEW /HPF    Bacteria, UA RARE (A) None Seen /HPF    Crystals, UA Rare (A) None Seen /HPF   POCT Glucose   Result Value Ref Range    Glucose 137 mg/dL    QC OK? ok    POCT Glucose   Result Value Ref Range    Meter Glucose 137 (H) 74 - 99 mg/dL   EKG 12 Lead   Result Value Ref Range    Ventricular Rate 76 BPM    Atrial Rate 76 BPM    P-R Interval 150 ms    QRS Duration 92 ms    Q-T Interval 394 ms    QTc Calculation (Bazett) 443 ms    P Axis 73 degrees    R Axis 4 degrees    T Axis 64 degrees       Radiology:  CT Head WO Contrast   Final Result   No acute intracranial abnormality. CT Cervical Spine WO Contrast   Final Result   No acute osseous abnormality of the cervical spine. XR CHEST PORTABLE   Final Result   No acute process. EKG: This EKG is signed and interpreted by me.     Rate: 76bpm  Rhythm: sinus  Interpretation: normal axis. No st segment elevation nor depression. Comparison: no previous EKG     ------------------------- NURSING NOTES AND VITALS REVIEWED ---------------------------  Date / Time Roomed:  8/11/2021  6:48 AM  ED Bed Assignment:  01/01    The nursing notes within the ED encounter and vital signs as below have been reviewed. /67   Pulse 76   Temp 98.1 °F (36.7 °C) (Oral)   Resp 16   Ht 6' (1.829 m)   Wt 173 lb (78.5 kg)   SpO2 98%   BMI 23.46 kg/m²   Oxygen Saturation Interpretation: Normal      ------------------------------------------ PROGRESS NOTES ------------------------------------------    I have spoken with the patient and discussed todays results, in addition to providing specific details for the plan of care and counseling regarding the diagnosis and prognosis. Their questions are answered at this time and they are agreeable with the plan. I discussed at length with them reasons for immediate return here for re evaluation. They will followup with their primary care physician by calling their office tomorrow. --------------------------------- ADDITIONAL PROVIDER NOTES ---------------------------------  At this time the patient is without objective evidence of an acute process requiring hospitalization or inpatient management. They have remained hemodynamically stable throughout their entire ED visit and are stable for discharge with outpatient follow-up. The plan has been discussed in detail and they are aware of the specific conditions for emergent return, as well as the importance of follow-up.       Discharge Medication List as of 8/11/2021  9:47 AM      START taking these medications    Details   cefdinir (OMNICEF) 300 MG capsule Take 1 capsule by mouth 2 times daily for 10 days, Disp-20 capsule, R-0Print      phenazopyridine (PYRIDIUM) 100 MG tablet Take 1 tablet by mouth 3 times daily as needed for Pain, Disp-9 tablet, R-0Print Diagnosis:  1. Syncope and collapse    2. UTI (urinary tract infection), bacterial        Disposition:  Patient's disposition: Discharge to home  Patient's condition is stable.        Lara Urbina,   Resident  08/11/21 1117

## 2021-08-14 ENCOUNTER — HOSPITAL ENCOUNTER (EMERGENCY)
Age: 51
Discharge: HOME | End: 2021-08-14
Attending: EMERGENCY MEDICINE
Payer: COMMERCIAL

## 2021-08-14 ENCOUNTER — APPOINTMENT (OUTPATIENT)
Dept: CT IMAGING | Age: 51
End: 2021-08-14
Payer: COMMERCIAL

## 2021-08-14 VITALS
TEMPERATURE: 97.4 F | DIASTOLIC BLOOD PRESSURE: 82 MMHG | OXYGEN SATURATION: 98 % | HEART RATE: 84 BPM | SYSTOLIC BLOOD PRESSURE: 125 MMHG | BODY MASS INDEX: 23.43 KG/M2 | HEIGHT: 72 IN | WEIGHT: 173 LBS | RESPIRATION RATE: 16 BRPM

## 2021-08-14 DIAGNOSIS — N39.0 URINARY TRACT INFECTION WITHOUT HEMATURIA, SITE UNSPECIFIED: ICD-10-CM

## 2021-08-14 DIAGNOSIS — R10.10 PAIN OF UPPER ABDOMEN: Primary | ICD-10-CM

## 2021-08-14 DIAGNOSIS — K59.00 CONSTIPATION, UNSPECIFIED CONSTIPATION TYPE: ICD-10-CM

## 2021-08-14 LAB
ALBUMIN SERPL-MCNC: 4.4 G/DL (ref 3.5–5.2)
ALP BLD-CCNC: 88 U/L (ref 35–104)
ALT SERPL-CCNC: 9 U/L (ref 0–32)
ANION GAP SERPL CALCULATED.3IONS-SCNC: 11 MMOL/L (ref 7–16)
AST SERPL-CCNC: 11 U/L (ref 0–31)
BACTERIA: NORMAL /HPF
BASOPHILS ABSOLUTE: 0.04 E9/L (ref 0–0.2)
BASOPHILS RELATIVE PERCENT: 0.8 % (ref 0–2)
BILIRUB SERPL-MCNC: 0.6 MG/DL (ref 0–1.2)
BILIRUBIN URINE: NEGATIVE
BLOOD, URINE: ABNORMAL
BUN BLDV-MCNC: 17 MG/DL (ref 6–20)
CALCIUM SERPL-MCNC: 9.3 MG/DL (ref 8.6–10.2)
CHLORIDE BLD-SCNC: 102 MMOL/L (ref 98–107)
CLARITY: CLEAR
CO2: 24 MMOL/L (ref 22–29)
COLOR: YELLOW
CREAT SERPL-MCNC: 0.9 MG/DL (ref 0.5–1)
EOSINOPHILS ABSOLUTE: 0.06 E9/L (ref 0.05–0.5)
EOSINOPHILS RELATIVE PERCENT: 1.1 % (ref 0–6)
GFR AFRICAN AMERICAN: >60
GFR NON-AFRICAN AMERICAN: >60 ML/MIN/1.73
GLUCOSE BLD-MCNC: 210 MG/DL (ref 74–99)
GLUCOSE URINE: >=1000 MG/DL
HCT VFR BLD CALC: 40.3 % (ref 34–48)
HEMOGLOBIN: 13.8 G/DL (ref 11.5–15.5)
IMMATURE GRANULOCYTES #: 0.02 E9/L
IMMATURE GRANULOCYTES %: 0.4 % (ref 0–5)
KETONES, URINE: NEGATIVE MG/DL
LACTIC ACID: 1.1 MMOL/L (ref 0.5–2.2)
LEUKOCYTE ESTERASE, URINE: NEGATIVE
LYMPHOCYTES ABSOLUTE: 1.56 E9/L (ref 1.5–4)
LYMPHOCYTES RELATIVE PERCENT: 29.6 % (ref 20–42)
MCH RBC QN AUTO: 33.2 PG (ref 26–35)
MCHC RBC AUTO-ENTMCNC: 34.2 % (ref 32–34.5)
MCV RBC AUTO: 96.9 FL (ref 80–99.9)
MONOCYTES ABSOLUTE: 0.36 E9/L (ref 0.1–0.95)
MONOCYTES RELATIVE PERCENT: 6.8 % (ref 2–12)
NEUTROPHILS ABSOLUTE: 3.23 E9/L (ref 1.8–7.3)
NEUTROPHILS RELATIVE PERCENT: 61.3 % (ref 43–80)
NITRITE, URINE: POSITIVE
PDW BLD-RTO: 11.8 FL (ref 11.5–15)
PH UA: 5.5 (ref 5–9)
PLATELET # BLD: 290 E9/L (ref 130–450)
PMV BLD AUTO: 9.6 FL (ref 7–12)
POTASSIUM REFLEX MAGNESIUM: 4.3 MMOL/L (ref 3.5–5)
PROTEIN UA: NEGATIVE MG/DL
RBC # BLD: 4.16 E12/L (ref 3.5–5.5)
RBC UA: NORMAL /HPF (ref 0–2)
SODIUM BLD-SCNC: 137 MMOL/L (ref 132–146)
SPECIFIC GRAVITY UA: 1.01 (ref 1–1.03)
TOTAL PROTEIN: 7.6 G/DL (ref 6.4–8.3)
UROBILINOGEN, URINE: 0.2 E.U./DL
WBC # BLD: 5.3 E9/L (ref 4.5–11.5)
WBC UA: NORMAL /HPF (ref 0–5)

## 2021-08-14 PROCEDURE — 6360000004 HC RX CONTRAST MEDICATION: Performed by: RADIOLOGY

## 2021-08-14 PROCEDURE — 2580000003 HC RX 258: Performed by: PHYSICIAN ASSISTANT

## 2021-08-14 PROCEDURE — 81001 URINALYSIS AUTO W/SCOPE: CPT

## 2021-08-14 PROCEDURE — 80053 COMPREHEN METABOLIC PANEL: CPT

## 2021-08-14 PROCEDURE — 83605 ASSAY OF LACTIC ACID: CPT

## 2021-08-14 PROCEDURE — 85025 COMPLETE CBC W/AUTO DIFF WBC: CPT

## 2021-08-14 PROCEDURE — 99283 EMERGENCY DEPT VISIT LOW MDM: CPT

## 2021-08-14 PROCEDURE — 74177 CT ABD & PELVIS W/CONTRAST: CPT

## 2021-08-14 RX ORDER — 0.9 % SODIUM CHLORIDE 0.9 %
1000 INTRAVENOUS SOLUTION INTRAVENOUS ONCE
Status: COMPLETED | OUTPATIENT
Start: 2021-08-14 | End: 2021-08-14

## 2021-08-14 RX ORDER — MAGNESIUM CARB/ALUMINUM HYDROX 105-160MG
150 TABLET,CHEWABLE ORAL DAILY
Qty: 300 ML | Refills: 0 | Status: SHIPPED | OUTPATIENT
Start: 2021-08-14 | End: 2021-08-16

## 2021-08-14 RX ADMIN — IOPAMIDOL 75 ML: 755 INJECTION, SOLUTION INTRAVENOUS at 22:03

## 2021-08-14 RX ADMIN — SODIUM CHLORIDE 1000 ML: 9 INJECTION, SOLUTION INTRAVENOUS at 21:20

## 2021-08-14 ASSESSMENT — PAIN SCALES - GENERAL: PAINLEVEL_OUTOF10: 9

## 2021-08-14 ASSESSMENT — PAIN DESCRIPTION - DESCRIPTORS: DESCRIPTORS: BURNING;DISCOMFORT

## 2021-08-15 NOTE — ED PROVIDER NOTES
HPI:  8/14/21, Time: 9:05 PM EDT         Aleks Quinones is a 54 y.o. female presenting to the ED for lower abdominal burning, beginning 1 week ago. The complaint has been persistent, moderate in severity, and worsened by nothing. On Keflex then cefdinir for UTI on 8/8 and 8/11 respectively. Symptoms continuing to persist and slightly worsen. She reports the pain is not necessarily when she is urinating. Does describe it as a burning sensation. No concern for STI. No genital lesions or rashes. Reports that the pain feels deep in her abdomen/pelvis. Does have a history of UTI's since she became diabetic but always easily resolved with antibiotics. Afebrile without recent travel or sick contacts. Patient denies all other symptoms at this time. Review of Systems:   A complete review of systems was performed and pertinent positives and negatives are stated within HPI, all other systems reviewed and are negative.          --------------------------------------------- PAST HISTORY ---------------------------------------------  Past Medical History:  has a past medical history of Diabetes mellitus ([facility] [address] Past Surgical History:  has a past surgical history that includes Hysterectomy and shoulder surgery (Left, 08/26/2019). Social History:  reports that she has never smoked. She has never used smokeless tobacco. She reports current alcohol use. She reports that she does not use drugs. Family History: family history is not on file. The patients home medications have been reviewed. Allergies: Patient has no known allergies.     -------------------------------------------------- RESULTS -------------------------------------------------  All laboratory and radiology results have been personally reviewed by myself   LABS:  Results for orders placed or performed during the hospital encounter of 08/14/21   CBC Auto Differential   Result Value Ref Range    WBC 5.3 4.5 - 11.5 E9/L    RBC 4.16 3.50 - 5.50 E12/L Urinalysis   Result Value Ref Range    WBC, UA NONE 0 - 5 /HPF    RBC, UA NONE 0 - 2 /HPF    Bacteria, UA NONE SEEN None Seen /HPF       RADIOLOGY:  Interpreted by Radiologist.  CT ABDOMEN PELVIS W IV CONTRAST Additional Contrast? None   Final Result   No acute process in the abdomen and pelvis. Constipation.             ------------------------- NURSING NOTES AND VITALS REVIEWED ---------------------------   The nursing notes within the ED encounter and vital signs as below have been reviewed. /82   Pulse 84   Temp 97.4 °F (36.3 °C) (Infrared)   Resp 16   Ht 6' (1.829 m)   Wt 173 lb (78.5 kg)   SpO2 98%   BMI 23.46 kg/m²   Oxygen Saturation Interpretation: Normal      ---------------------------------------------------PHYSICAL EXAM--------------------------------------      Constitutional/General: Alert and oriented x3, well appearing, non toxic in NAD  Head: Normocephalic and atraumatic  Eyes: PERRL, EOMI  Mouth: Oropharynx clear, handling secretions, no trismus  Neck: Supple, full ROM,   Pulmonary: Lungs clear to auscultation bilaterally, no wheezes, rales, or rhonchi. Not in respiratory distress  Cardiovascular:  Regular rate and rhythm, no murmurs, gallops, or rubs. 2+ distal pulses  Abdomen: Soft, non tender, non distended,   Extremities: Moves all extremities x 4. Warm and well perfused. Suprapubic tenderness to palpation. Skin: warm and dry without rash  Neurologic: GCS 15,  Psych: Normal Affect      ------------------------------ ED COURSE/MEDICAL DECISION MAKING----------------------  Medications   0.9 % sodium chloride bolus (1,000 mLs Intravenous New Bag 8/14/21 2120)   iopamidol (ISOVUE-370) 76 % injection 75 mL (75 mLs Intravenous Given 8/14/21 2203)         ED COURSE:       Medical Decision Making:    Patient's CT scan reviewed and positive for constipation. Will prescribe magnesium citrate. She is to continue antibiotics for urinary tract infection as prescribed.  Follow up with PCP in 2 days. Return to the ED for any worsening symptoms. Counseling: The emergency provider has spoken with the patient and discussed todays results, in addition to providing specific details for the plan of care and counseling regarding the diagnosis and prognosis. Questions are answered at this time and they are agreeable with the plan.      --------------------------------- IMPRESSION AND DISPOSITION ---------------------------------    IMPRESSION  1. Pain of upper abdomen    2. Constipation, unspecified constipation type    3. Urinary tract infection without hematuria, site unspecified        DISPOSITION  Disposition: Discharge to home  Patient condition is stable      NOTE: This report was transcribed using voice recognition software. Every effort was made to ensure accuracy; however, inadvertent computerized transcription errors may be present       ATTENDING PROVIDER ATTESTATION:     I have personally performed and/or participated in the history, exam, medical decision making, and procedures and agree with all pertinent clinical information. I have also reviewed and agree with the past medical, family and social history unless otherwise noted. My findings/Plan: Heart RRR. Lungs CTA bilaterally. Abdomen soft. Mild suprapubic tenderness. Bowel sounds normal. Supportive care. Discharge for outpatient follow up.          [facility],   08/14/21 [id number]

## 2021-12-18 ENCOUNTER — HOSPITAL ENCOUNTER (EMERGENCY)
Age: 51
Discharge: HOME | End: 2021-12-18
Payer: COMMERCIAL

## 2021-12-18 VITALS
SYSTOLIC BLOOD PRESSURE: 103 MMHG | TEMPERATURE: 97.5 F | DIASTOLIC BLOOD PRESSURE: 69 MMHG | WEIGHT: 175 LBS | RESPIRATION RATE: 16 BRPM | OXYGEN SATURATION: 98 % | BODY MASS INDEX: 23.73 KG/M2 | HEART RATE: 75 BPM

## 2021-12-18 DIAGNOSIS — J02.9 VIRAL PHARYNGITIS: Primary | ICD-10-CM

## 2021-12-18 PROCEDURE — 99211 OFF/OP EST MAY X REQ PHY/QHP: CPT

## 2021-12-18 NOTE — ED PROVIDER NOTES
[address] [facility]  Department of Emergency Medicine   ED  Encounter Note  Admit Date/RoomTime: 2021  9:41 AM  ED Room:   NAME: Jeffy Payan  : 1970  MRN: [id number]     Chief Complaint:  Pharyngitis (achy, headache, started yesterday)    HISTORY OF PRESENT ILLNESS        Jeffy Paayn is a 54 y.o. female who presents to the ED with complaints of sore throat and achiness. Patient states yesterday she just started to not feel well. Achy. Headache. Sore throat. Patient denies fevers or chills. Denies loss of taste or loss of smell. Denies productive cough. Denies shortness of breath. Denies nausea, vomiting or diarrhea. Symptoms are mild in severity. She did take over-the-counter Motrin 1 time. Patient is a diabetic. States her blood sugars were in the 120s yesterday. She has not checked her sugar today. ROS   Pertinent positives and negatives are stated within HPI, all other systems reviewed and are negative. Past Medical History:  has a past medical history of Diabetes mellitus ([address] Surgical History:  has a past surgical history that includes Hysterectomy and shoulder surgery (Left, 2019). Social History:  reports that she has never smoked. She has never used smokeless tobacco. She reports current alcohol use. She reports that she does not use drugs. Family History: family history is not on file. Allergies: Patient has no known allergies. PHYSICAL EXAM   Oxygen Saturation Interpretation: Normal on room air analysis. ED Triage Vitals [21 0942]   BP Temp Temp src Pulse Resp SpO2 Height Weight   103/69 97.5 °F (36.4 °C) -- 75 16 98 % -- 175 lb (79.4 kg)       General:  NAD. Alert and Oriented. Well-appearing. Skin:  Warm, dry. No rashes. Head:  Normocephalic. Atraumatic. Eyes:  EOMI. Conjunctiva normal.  ENT:  Oral mucosa moist.  Airway patent. Posterior pharynx pink without erythema, without swelling, without exudate. Uvula midline with equal rise and without edema. Bilateral ear canals patent with minimal cerumen. Bilateral TM's without erythema, without bulging. Nasal turbinates with minimal swelling. Frontal and maxillary sinuses nontender to palpation. Neck:  Supple. Normal ROM. No tender lymphadenopathy. Respiratory:  No respiratory distress. No labored breathing. Lungs clear without rales, rhonchi or wheezing. Cardiovascular:  Regular rate. No Murmur. No peripheral edema. Extremities warm and good color. Extremities:  Normal ROM. Nontender to palpation. Atraumatic. Back:  Normal ROM. Nontender to palpation. Neuro:  Alert and Oriented to person, place, time and situation. Normal LOC. Moves all extremities. Speech fluent. Psych:  Calm and Cooperative. Normal thought process. Normal judgement. Lab / Imaging Results   (All laboratory and radiology results have been personally reviewed by myself)  Labs:  No results found for this visit on 12/18/21. Imaging: All Radiology results interpreted by Radiologist unless otherwise noted. No orders to display       ED Course / Medical Decision Making   Medications - No data to display     Re-examination:  12/18/21       Time:   Patients condition . Consult(s):   None    Procedure(s):   none    MDM:   A diagnosis of Upper Respiratory Infection is most likely viral in nature. Antibiotics have no effect on viruses and is not prescribed for viral illnesses. It is recommended that you increase your fluid intake. Take otc Tylenol or Motrin as needed. Place a vaporizer or humidifier in your room to moisten the air. Drink warm teas with honey. If you have a sore throat then gargle with warm salt water 3 times daily. OTC decongestants may or may not benefit you. URI's can last 10-14 days. If you feel that your symptoms are worsening or you have any concerns please follow up with your family doctor or go to your closest ED/Urgent care.       Plan of Care/Counseling:  Physician Assistant on duty reviewed today's visit with the patient in addition to providing specific details for the plan of care and counseling regarding the diagnosis and prognosis. Questions are answered at this time and are agreeable with the plan. ASSESSMENT     1. Viral pharyngitis New Problem     PLAN   Discharged home. Patient condition is good    New Medications     New Prescriptions    No medications on file     Electronically signed by TOM Oliver   DD: 12/18/21  **This report was transcribed using voice recognition software. Every effort was made to ensure accuracy; however, inadvertent computerized transcription errors may be present.   END OF ED PROVIDER NOTE       James Crenshaw, [address]  12/18/21 0655

## 2022-04-02 ENCOUNTER — HOSPITAL ENCOUNTER (EMERGENCY)
Age: 52
Discharge: HOME | End: 2022-04-02
Payer: COMMERCIAL

## 2022-04-02 VITALS
WEIGHT: 175 LBS | BODY MASS INDEX: 23.73 KG/M2 | OXYGEN SATURATION: 98 % | RESPIRATION RATE: 16 BRPM | HEART RATE: 85 BPM | SYSTOLIC BLOOD PRESSURE: 108 MMHG | TEMPERATURE: 98.4 F | DIASTOLIC BLOOD PRESSURE: 76 MMHG

## 2022-04-02 DIAGNOSIS — J02.9 ACUTE PHARYNGITIS, UNSPECIFIED ETIOLOGY: ICD-10-CM

## 2022-04-02 DIAGNOSIS — J01.90 ACUTE SINUSITIS, RECURRENCE NOT SPECIFIED, UNSPECIFIED LOCATION: Primary | ICD-10-CM

## 2022-04-02 DIAGNOSIS — H66.90 ACUTE OTITIS MEDIA, UNSPECIFIED OTITIS MEDIA TYPE: ICD-10-CM

## 2022-04-02 PROCEDURE — 99211 OFF/OP EST MAY X REQ PHY/QHP: CPT

## 2022-04-02 RX ORDER — PSEUDOEPHEDRINE HYDROCHLORIDE 30 MG/1
30 TABLET ORAL EVERY 4 HOURS PRN
Qty: 20 TABLET | Refills: 0 | Status: SHIPPED | OUTPATIENT
Start: 2022-04-02 | End: 2022-07-31 | Stop reason: ALTCHOICE

## 2022-04-02 RX ORDER — AMOXICILLIN AND CLAVULANATE POTASSIUM 875; 125 MG/1; MG/1
1 TABLET, FILM COATED ORAL 2 TIMES DAILY
Qty: 20 TABLET | Refills: 0 | Status: SHIPPED | OUTPATIENT
Start: 2022-04-02 | End: 2022-04-12

## 2022-04-02 NOTE — ED PROVIDER NOTES
Department of Emergency Medicine   [address] [place] [address]  Provider Note  Admit Date/RoomTime: 2022  1:25 PM  Room:   NAME: Eli Huynh  : 1970  MRN: [id number]     Chief Complaint:  Pharyngitis (sinus infection, glands in neck are swollen and painful, drainage, congestion)    History of Present Illness       Eli Huynh is a 50 y.o. old female who presents to the urgent care center by private vehicle for evaluation she has been sick for a week she said her glands feel swollen in her neck she has sinus congestion she has pressure over her forehead and she has bright green drainage from her sinuses and her ears feel full pressure. She said her throat is sore and irritated. She is denying any chest congestion cough or shortness of breath denying any fevers chills or body aches. ROS    Pertinent positives and negatives are stated within HPI, all other systems reviewed and are negative. Past Surgical History:   Procedure Laterality Date    HYSTERECTOMY      SHOULDER SURGERY Left 2019   Social History:  reports that she has never smoked. She has never used smokeless tobacco. She reports current alcohol use. She reports that she does not use drugs. Family History: family history is not on file. Allergies: Patient has no known allergies. Physical Exam            ED Triage Vitals [22 1327]   BP Temp Temp src Pulse Resp SpO2 Height Weight   108/76 98.4 °F (36.9 °C) -- 85 16 98 % -- (!) 385 lb 12.9 oz (175 kg)      Oxygen Saturation Interpretation: Normal.    Constitutional:  Alert, development consistent with age. Ears:  External Ears: Bilateral normal.               TM's & External Canals: right TM erythematous and left TM normal.  Nose:   There is no discharge, swelling or lesions noted. Sinuses: mild Bilateral maxillary sinus tenderness. mild Bilateral frontal sinus tenderness. Mouth:  normal tongue and buccal mucosa.    Throat: mild erythema. Airway Patent. Neck/Lymphatics:  Neck Supple. Respiratory:   Breath sounds: Bilateral normal.  Lung sounds: normal.   CV:  Regular rate and rhythm, normal heart sounds, without pathological murmurs, ectopy, gallops, or rubs. GI:  Abdomen Soft, nontender, good bowel sounds. No firm or pulsatile mass. Integument:  Normal turgor. Warm, dry, without visible rash. Neurological:  Oriented. Motor functions intact. Lab / Imaging Results   (All laboratory and radiology results have been personally reviewed by myself)  Labs:  No results found for this visit on 04/02/22. Imaging: All Radiology results interpreted by Radiologist unless otherwise noted. No orders to display     ED Course / Medical Decision Making   Medications - No data to display       MDM:   Patient here with sinusitis and pharyngitis also she has a right otitis I did put her on Augmentin and some Sudafed for the congestion she should follow-up with her Dr    1. Acute sinusitis, recurrence not specified, unspecified location    2. Acute pharyngitis, unspecified etiology    3. Acute otitis media, unspecified otitis media type      Plan   Discharge to home and advised to contact Santosh Varghese, [address]  [phone]      As needed   Patient condition is good    New Medications     New Prescriptions    AMOXICILLIN-CLAVULANATE (AUGMENTIN) 875-125 MG PER TABLET    Take 1 tablet by mouth 2 times daily for 10 days    PSEUDOEPHEDRINE (DECONGESTANT) 30 MG TABLET    Take 1 tablet by mouth every 4 hours as needed for Congestion     Electronically signed by SHARLENE Albright CNP   DD: 4/2/22  **This report was transcribed using voice recognition software. Every effort was made to ensure accuracy; however, inadvertent computerized transcription errors may be present.   END OF ED PROVIDER NOTE     SHARLENE Albright CNP  04/02/22 2638

## 2022-05-19 ENCOUNTER — HOSPITAL ENCOUNTER (EMERGENCY)
Age: 52
Discharge: HOME | End: 2022-05-19
Payer: COMMERCIAL

## 2022-05-19 VITALS
TEMPERATURE: 97.5 F | BODY MASS INDEX: 23.7 KG/M2 | HEIGHT: 72 IN | SYSTOLIC BLOOD PRESSURE: 129 MMHG | WEIGHT: 175 LBS | RESPIRATION RATE: 20 BRPM | HEART RATE: 88 BPM | OXYGEN SATURATION: 99 % | DIASTOLIC BLOOD PRESSURE: 83 MMHG

## 2022-05-19 DIAGNOSIS — N30.00 ACUTE CYSTITIS WITHOUT HEMATURIA: Primary | ICD-10-CM

## 2022-05-19 LAB
BACTERIA: ABNORMAL /HPF
BILIRUBIN URINE: NEGATIVE
BLOOD, URINE: NEGATIVE
CLARITY: ABNORMAL
COLOR: YELLOW
EPITHELIAL CELLS, UA: ABNORMAL /HPF
GLUCOSE URINE: >=1000 MG/DL
KETONES, URINE: NEGATIVE MG/DL
LEUKOCYTE ESTERASE, URINE: NEGATIVE
NITRITE, URINE: NEGATIVE
PH UA: 5 (ref 5–9)
PROTEIN UA: NEGATIVE MG/DL
RBC UA: ABNORMAL /HPF (ref 0–2)
SPECIFIC GRAVITY UA: 1.01 (ref 1–1.03)
UROBILINOGEN, URINE: 0.2 E.U./DL
WBC UA: ABNORMAL /HPF (ref 0–5)

## 2022-05-19 PROCEDURE — 99211 OFF/OP EST MAY X REQ PHY/QHP: CPT

## 2022-05-19 PROCEDURE — 81001 URINALYSIS AUTO W/SCOPE: CPT

## 2022-05-19 PROCEDURE — 87077 CULTURE AEROBIC IDENTIFY: CPT

## 2022-05-19 PROCEDURE — 87088 URINE BACTERIA CULTURE: CPT

## 2022-05-19 PROCEDURE — 87186 SC STD MICRODIL/AGAR DIL: CPT

## 2022-05-19 RX ORDER — CIPROFLOXACIN 500 MG/1
500 TABLET, FILM COATED ORAL 2 TIMES DAILY
Qty: 20 TABLET | Refills: 0 | Status: SHIPPED | OUTPATIENT
Start: 2022-05-19 | End: 2022-05-29

## 2022-05-19 RX ORDER — PHENAZOPYRIDINE HYDROCHLORIDE 200 MG/1
200 TABLET, FILM COATED ORAL 3 TIMES DAILY PRN
Qty: 6 TABLET | Refills: 0 | Status: SHIPPED | OUTPATIENT
Start: 2022-05-19 | End: 2022-05-21

## 2022-05-19 ASSESSMENT — PAIN - FUNCTIONAL ASSESSMENT: PAIN_FUNCTIONAL_ASSESSMENT: NONE - DENIES PAIN

## 2022-05-19 NOTE — ED PROVIDER NOTES
[facility]  Department of Emergency Medicine  UC Encounter Note  22   4:11 PM EDT      NAME: Toshia Lama  :  1970  MRN:  [id number]    Chief Complaint: Urinary Tract Infection (started  with burning   lower back pain)      This is a 61-year-old female who presents to urgent care complaining of urinary symptoms. She mostly has burning with urination. She does have a little bit of lower back pain. She denies any fevers or chills. She does state a history of diabetes. She does state that she had a urinary tract infection last year and it took a couple antibiotics and it seemed like Cipro helped the best for it. On first contact patient she appears to be in no acute distress. Review of Systems  Pertinent positives and negatives are stated within HPI, all other systems reviewed and are negative. Physical Exam  Vitals and nursing note reviewed. Constitutional:       Appearance: She is well-developed. HENT:      Head: Normocephalic and atraumatic. Right Ear: Hearing and external ear normal.      Left Ear: Hearing and external ear normal.      Nose: Nose normal.      Mouth/Throat:      Pharynx: Uvula midline. Eyes:      General: Lids are normal.      Conjunctiva/sclera: Conjunctivae normal.      Pupils: Pupils are equal, round, and reactive to light. Cardiovascular:      Rate and Rhythm: Normal rate and regular rhythm. Heart sounds: Normal heart sounds. No murmur heard. Pulmonary:      Effort: Pulmonary effort is normal.      Breath sounds: Normal breath sounds. Abdominal:      General: Bowel sounds are normal.      Palpations: Abdomen is soft. Abdomen is not rigid. Tenderness: There is no abdominal tenderness. There is no right CVA tenderness, left CVA tenderness, guarding or rebound. Musculoskeletal:      Cervical back: Normal range of motion and neck supple. Skin:     General: Skin is warm and dry.       Findings: No abrasion or rash. Neurological:      General: No focal deficit present. Mental Status: She is alert and oriented to person, place, and time. GCS: GCS eye subscore is 4. GCS verbal subscore is 5. GCS motor subscore is 6. Cranial Nerves: No cranial nerve deficit. Sensory: No sensory deficit. Coordination: Coordination normal.      Gait: Gait normal.         Procedures    MDM  Number of Diagnoses or Management Options  Acute cystitis without hematuria  Diagnosis management comments: Patient is in no acute distress. She has taken Cipro in the past with improvement of her symptoms. She also wants to take Pyridium to help with her symptoms. Instructions given.           --------------------------------------------- PAST HISTORY ---------------------------------------------  Past Medical History:  has a past medical history of Diabetes mellitus ([facility] [address] Past Surgical History:  has a past surgical history that includes Hysterectomy and shoulder surgery (Left, 08/26/2019). Social History:  reports that she has never smoked. She has never used smokeless tobacco. She reports current alcohol use. She reports that she does not use drugs. Family History: family history is not on file. The patients home medications have been reviewed. Allergies: Patient has no known allergies.     -------------------------------------------------- RESULTS -------------------------------------------------  Results for orders placed or performed during the hospital encounter of 05/19/22   Urinalysis   Result Value Ref Range    Color, UA Yellow Straw/Yellow    Clarity, UA SL CLOUDY Clear    Glucose, Ur >=1000 (A) Negative mg/dL    Bilirubin Urine Negative Negative    Ketones, Urine Negative Negative mg/dL    Specific Gravity, UA 1.015 1.005 - 1.030    Blood, Urine Negative Negative    pH, UA 5.0 5.0 - 9.0    Protein, UA Negative Negative mg/dL    Urobilinogen, Urine 0.2 <2.0 E.U./dL    Nitrite, Urine Negative Negative    Leukocyte Esterase, Urine Negative Negative   Microscopic Urinalysis   Result Value Ref Range    WBC, UA 10-20 (A) 0 - 5 /HPF    RBC, UA 0-1 0 - 2 /HPF    Epithelial Cells, UA FEW /HPF    Bacteria, UA MODERATE (A) None Seen /HPF     No orders to display       ------------------------- NURSING NOTES AND VITALS REVIEWED ---------------------------   The nursing notes within the ED encounter and vital signs as below have been reviewed. /83   Pulse 88   Temp 97.5 °F (36.4 °C) (Infrared)   Resp 20   Ht 6' (1.829 m)   Wt 175 lb (79.4 kg)   SpO2 99%   BMI 23.73 kg/m²   Oxygen Saturation Interpretation: Normal      ------------------------------------------ PROGRESS NOTES ------------------------------------------   I have spoken with the patient and discussed todays results, in addition to providing specific details for the plan of care and counseling regarding the diagnosis and prognosis. Their questions are answered at this time and they are agreeable with the plan.      --------------------------------- ADDITIONAL PROVIDER NOTES ---------------------------------     This patient is stable for discharge. I have shared the specific conditions for return, as well as the importance of follow-up. * NOTE: This report was transcribed using voice recognition software. Every effort was made to ensure accuracy; however, inadvertent computerized transcription errors may be present.    --------------------------------- IMPRESSION AND DISPOSITION ---------------------------------    IMPRESSION  1.  Acute cystitis without hematuria        DISPOSITION  Disposition: Discharge to home  Patient condition is good       Justin Cardenas PA-C  05/19/22 1296

## 2022-05-22 LAB
ORGANISM: ABNORMAL
URINE CULTURE, ROUTINE: ABNORMAL

## 2022-07-20 ENCOUNTER — APPOINTMENT (OUTPATIENT)
Dept: GENERAL RADIOLOGY | Age: 52
End: 2022-07-20
Payer: COMMERCIAL

## 2022-07-20 ENCOUNTER — APPOINTMENT (OUTPATIENT)
Dept: CT IMAGING | Age: 52
End: 2022-07-20
Payer: COMMERCIAL

## 2022-07-20 ENCOUNTER — HOSPITAL ENCOUNTER (EMERGENCY)
Age: 52
Discharge: HOME | End: 2022-07-20
Payer: COMMERCIAL

## 2022-07-20 VITALS
OXYGEN SATURATION: 98 % | HEIGHT: 72 IN | SYSTOLIC BLOOD PRESSURE: 104 MMHG | DIASTOLIC BLOOD PRESSURE: 67 MMHG | WEIGHT: 173 LBS | HEART RATE: 64 BPM | TEMPERATURE: 98.2 F | BODY MASS INDEX: 23.43 KG/M2 | RESPIRATION RATE: 14 BRPM

## 2022-07-20 DIAGNOSIS — R42 DIZZINESS: Primary | ICD-10-CM

## 2022-07-20 DIAGNOSIS — R01.1 SYSTOLIC MURMUR: ICD-10-CM

## 2022-07-20 LAB
ANION GAP SERPL CALCULATED.3IONS-SCNC: 12 MMOL/L (ref 7–16)
BACTERIA: ABNORMAL /HPF
BASOPHILS ABSOLUTE: 0.05 E9/L (ref 0–0.2)
BASOPHILS RELATIVE PERCENT: 1 % (ref 0–2)
BILIRUBIN URINE: NEGATIVE
BLOOD, URINE: NEGATIVE
BUN BLDV-MCNC: 15 MG/DL (ref 6–20)
CALCIUM SERPL-MCNC: 9.5 MG/DL (ref 8.6–10.2)
CHLORIDE BLD-SCNC: 102 MMOL/L (ref 98–107)
CLARITY: CLEAR
CO2: 22 MMOL/L (ref 22–29)
COLOR: ABNORMAL
CREAT SERPL-MCNC: 0.9 MG/DL (ref 0.5–1)
EOSINOPHILS ABSOLUTE: 0.06 E9/L (ref 0.05–0.5)
EOSINOPHILS RELATIVE PERCENT: 1.1 % (ref 0–6)
EPITHELIAL CELLS, UA: ABNORMAL /HPF
GFR AFRICAN AMERICAN: >60
GFR NON-AFRICAN AMERICAN: >60 ML/MIN/1.73
GLUCOSE BLD-MCNC: 228 MG/DL (ref 74–99)
GLUCOSE URINE: >=1000 MG/DL
HCT VFR BLD CALC: 39.8 % (ref 34–48)
HEMOGLOBIN: 13.4 G/DL (ref 11.5–15.5)
IMMATURE GRANULOCYTES #: 0.01 E9/L
IMMATURE GRANULOCYTES %: 0.2 % (ref 0–5)
KETONES, URINE: NEGATIVE MG/DL
LEUKOCYTE ESTERASE, URINE: NEGATIVE
LYMPHOCYTES ABSOLUTE: 1.31 E9/L (ref 1.5–4)
LYMPHOCYTES RELATIVE PERCENT: 25.1 % (ref 20–42)
MCH RBC QN AUTO: 33.1 PG (ref 26–35)
MCHC RBC AUTO-ENTMCNC: 33.7 % (ref 32–34.5)
MCV RBC AUTO: 98.3 FL (ref 80–99.9)
MONOCYTES ABSOLUTE: 0.38 E9/L (ref 0.1–0.95)
MONOCYTES RELATIVE PERCENT: 7.3 % (ref 2–12)
NEUTROPHILS ABSOLUTE: 3.41 E9/L (ref 1.8–7.3)
NEUTROPHILS RELATIVE PERCENT: 65.3 % (ref 43–80)
NITRITE, URINE: NEGATIVE
PDW BLD-RTO: 12 FL (ref 11.5–15)
PH UA: 5.5 (ref 5–9)
PLATELET # BLD: 273 E9/L (ref 130–450)
PMV BLD AUTO: 10 FL (ref 7–12)
POTASSIUM REFLEX MAGNESIUM: 4.4 MMOL/L (ref 3.5–5)
PROTEIN UA: NEGATIVE MG/DL
RBC # BLD: 4.05 E12/L (ref 3.5–5.5)
RBC UA: ABNORMAL /HPF (ref 0–2)
SODIUM BLD-SCNC: 136 MMOL/L (ref 132–146)
SPECIFIC GRAVITY UA: 1.01 (ref 1–1.03)
TROPONIN, HIGH SENSITIVITY: <6 NG/L (ref 0–9)
UROBILINOGEN, URINE: 0.2 E.U./DL
WBC # BLD: 5.2 E9/L (ref 4.5–11.5)
WBC UA: ABNORMAL /HPF (ref 0–5)

## 2022-07-20 PROCEDURE — 70450 CT HEAD/BRAIN W/O DYE: CPT

## 2022-07-20 PROCEDURE — 71046 X-RAY EXAM CHEST 2 VIEWS: CPT

## 2022-07-20 PROCEDURE — 36415 COLL VENOUS BLD VENIPUNCTURE: CPT

## 2022-07-20 PROCEDURE — 84484 ASSAY OF TROPONIN QUANT: CPT

## 2022-07-20 PROCEDURE — 81001 URINALYSIS AUTO W/SCOPE: CPT

## 2022-07-20 PROCEDURE — 93005 ELECTROCARDIOGRAM TRACING: CPT | Performed by: PHYSICIAN ASSISTANT

## 2022-07-20 PROCEDURE — 80048 BASIC METABOLIC PNL TOTAL CA: CPT

## 2022-07-20 PROCEDURE — 99285 EMERGENCY DEPT VISIT HI MDM: CPT

## 2022-07-20 PROCEDURE — 85025 COMPLETE CBC W/AUTO DIFF WBC: CPT

## 2022-07-20 ASSESSMENT — PAIN - FUNCTIONAL ASSESSMENT: PAIN_FUNCTIONAL_ASSESSMENT: 0-10

## 2022-07-20 ASSESSMENT — PAIN SCALES - GENERAL: PAINLEVEL_OUTOF10: 0

## 2022-07-20 NOTE — ED PROVIDER NOTES
[facility]  Department of Emergency Medicine   ED  Encounter Note  Admit Date/RoomTime: 2022  6:05 PM  ED Room:     NAME: Miko Arzola  : 1970  MRN: [id number]     Chief Complaint:  Dizziness (Period of dizziness while outside, family lowered her to the ground, )    History of Present Illness        Miko Arzola is a 50 y.o. old female who presents to the emergency department by ambulance, for sudden onset dizziness, Lightheaded, and syncope which began several minute(s) prior to arrival.  Patient reports that she was in a standing position at home when she experienced the symptoms which caused her to faint. She reports that her family member was with her and did catch her and laid her down onto the floor. She then called EMS who brought her here. Patient reports that she did feel nauseous at the time when she did pass out. She reports she did not hit her head or sustain additional injuries. She states she does not feel any aches or pains at this time. She reports she has had several episodes of dizziness and syncopal episodes in the past several months for which she has seen her doctor for but nothing is ever been discovered. Patient reports that she does have significant past medical history of type 2 diabetes but denies additional symptoms. She reports that she does have a grandfather whom had a AAA as well as an uncle who has a AAA. She has never been diagnosed with a AAA and has never had any cardiovascular disease that she is aware of. Patient denies chest pain, shortness of breath, fever, chills, vomiting, diarrhea, dizziness lightheadedness visual acuity changes.            CAD Risk Factors:      Smoker:    No       FHx:    Yes       High Lipids:    No       HTN:    No       Diabetes:    Yes       Cocaine Use:    No       Lupus:    No        Vertebrobasilar CVA Symptoms:    Vertigo: NO (0)   Diplopia: NO (0)   Decreased Vision: NO (0) Oscillopsia: NO (0)   Ataxia: NO (0)   Precipitated by moving one upper extremity with  Decreased BP (subclavian steal syndrome):       NO (0)   Total:    0        AAA Risk Factors:      FHx:    Yes       HTN:    No       Connective Tissue Dx   (Marfan's -  Iza-Danlos Syndrome): No       Prior AAA    No       ROS   Pertinent positives and negatives are stated within HPI, all other systems reviewed and are negative. Past Medical History:  has a past medical history of Diabetes mellitus ([facility] [address] Surgical History:  has a past surgical history that includes Hysterectomy and shoulder surgery (Left, 08/26/2019). Social History:  reports that she has never smoked. She has never used smokeless tobacco. She reports current alcohol use. She reports that she does not use drugs. Family History: family history is not on file. Allergies: Patient has no known allergies. Physical Exam   Oxygen Saturation Interpretation: Normal.        ED Triage Vitals [07/20/22 1810]   BP Temp Temp Source Heart Rate Resp SpO2 Height Weight   109/68 98.2 °F (36.8 °C) Oral 72 15 97 % 6' (1.829 m) 173 lb (78.5 kg)     Constitutional:  Level of Consciousness: Awake and alert. ETOH: No.         Distress: none,  cooperative. Eyes:  PERRL, EOMI, no discharge or conjunctival injection. Ears:  External ears without lesions. Throat:  Pharynx without injection, exudate, or tonsillar hypertrophy. Airway patient. Neck:  Normal ROM. Supple. Respiratory:  Clear to auscultation and breath sounds equal.  CV:  Regular rate and rhythm, normal heart sounds, without pathological murmurs, ectopy, gallops, or rubs. GI:  Abdomen Soft, nontender, good bowel sounds. No firm or pulsatile mass. Non acute abdomen. Back:  No costovertebral tenderness. Integument:  Normal turgor. Warm, dry, without visible rash, unless noted elsewhere.   Lymphatic: no lymphadenopathy noted  Vascular: 2+ dorsalis pedis and posterior tibialis pulses bilaterally. Neurological:  Oriented. Motor functions intact.     Lab / Imaging Results   (All laboratory and radiology results have been personally reviewed by myself)  Labs:  Results for orders placed or performed during the hospital encounter of 07/20/22   CBC with Auto Differential   Result Value Ref Range    WBC 5.2 4.5 - 11.5 E9/L    RBC 4.05 3.50 - 5.50 E12/L    Hemoglobin 13.4 11.5 - 15.5 g/dL    Hematocrit 39.8 34.0 - 48.0 %    MCV 98.3 80.0 - 99.9 fL    MCH 33.1 26.0 - 35.0 pg    MCHC 33.7 32.0 - 34.5 %    RDW 12.0 11.5 - 15.0 fL    Platelets 856 861 - 624 E9/L    MPV 10.0 7.0 - 12.0 fL    Neutrophils % 65.3 43.0 - 80.0 %    Immature Granulocytes % 0.2 0.0 - 5.0 %    Lymphocytes % 25.1 20.0 - 42.0 %    Monocytes % 7.3 2.0 - 12.0 %    Eosinophils % 1.1 0.0 - 6.0 %    Basophils % 1.0 0.0 - 2.0 %    Neutrophils Absolute 3.41 1.80 - 7.30 E9/L    Immature Granulocytes # 0.01 E9/L    Lymphocytes Absolute 1.31 (L) 1.50 - 4.00 E9/L    Monocytes Absolute 0.38 0.10 - 0.95 E9/L    Eosinophils Absolute 0.06 0.05 - 0.50 E9/L    Basophils Absolute 0.05 0.00 - 0.20 M9/N   Basic Metabolic Panel w/ Reflex to MG   Result Value Ref Range    Sodium 136 132 - 146 mmol/L    Potassium reflex Magnesium 4.4 3.5 - 5.0 mmol/L    Chloride 102 98 - 107 mmol/L    CO2 22 22 - 29 mmol/L    Anion Gap 12 7 - 16 mmol/L    Glucose 228 (H) 74 - 99 mg/dL    BUN 15 6 - 20 mg/dL    Creatinine 0.9 0.5 - 1.0 mg/dL    GFR Non-African American >60 >=60 mL/min/1.73    GFR African American >60     Calcium 9.5 8.6 - 10.2 mg/dL   Troponin   Result Value Ref Range    Troponin, High Sensitivity <6 0 - 9 ng/L   Urinalysis with Microscopic   Result Value Ref Range    Color, UA Straw Straw/Yellow    Clarity, UA Clear Clear    Glucose, Ur >=1000 (A) Negative mg/dL    Bilirubin Urine Negative Negative    Ketones, Urine Negative Negative mg/dL    Specific Gravity, UA 1.015 1.005 - 1.030    Blood, Urine Negative Negative    pH, UA 5.5 5.0 - 9.0    Protein, UA Negative Negative mg/dL    Urobilinogen, Urine 0.2 <2.0 E.U./dL    Nitrite, Urine Negative Negative    Leukocyte Esterase, Urine Negative Negative    WBC, UA NONE 0 - 5 /HPF    RBC, UA NONE 0 - 2 /HPF    Epithelial Cells, UA NONE SEEN /HPF    Bacteria, UA RARE (A) None Seen /HPF   EKG 12 Lead   Result Value Ref Range    Ventricular Rate 74 BPM    Atrial Rate 74 BPM    P-R Interval 156 ms    QRS Duration 104 ms    Q-T Interval 394 ms    QTc Calculation (Bazett) 437 ms    P Axis 59 degrees    R Axis 21 degrees    T Axis 49 degrees     Imaging: All Radiology results interpreted by Radiologist unless otherwise noted. CT Head WO Contrast   Final Result   No acute intracranial abnormality. XR CHEST (2 VW)   Final Result   No acute process. EKG: This EKG is signed and interpreted by me. Rate: normal  Rhythm: Sinus  Interpretation: non-specific EKG  Comparison: stable as compared to patient's most recent EKG    ED Course / Medical Decision Making   Medications - No data to display     Re-Evaluations:  7/20/22      Time: 7:50 PM  Patients condition remains stable. Results were discussed. Patient did verbalize that she is also having intermittent left flank pain off and on for the past several months and has had UTIs in the past for which she did verbalize concerns that maybe she had a UTI that may have made her dizzy and pass out. Time: 9:20PM  Results of imaging were discussed at this time as well as plan for discharged home with cardiac follow-up. Patient agreeable. Consultations:             None    Procedures:   none    MDM: Patient presented to ER for evaluation of sudden onset dizziness lightheadedness and syncopal episode just prior to arrival where she did not sustain any injuries that she was caught by family member. Patient reported to ER via ambulance and had cardiac rule out with an ED setting today.   Blood work demonstrated no signs of electrolyte abnormality, anemia or signs of infection. EKG was sinus without ST elevation. Patient's troponin demonstrated within normal limits. Based on the fact that the patient reported that she has had a few episodes of dizziness over the past several months and has not had a scan over her head in approximately 1 year, CT imaging of the patient's head was obtained. CT imaging demonstrated no acute process. At this time, patient is without objective evidence of emergent condition requiring further eval in the ED setting or admit to the hospital.  Patient appropriate for discharged home with follow-up to cardiology at this time as she is alert and oriented, no acute distress and afebrile. Patient recommended to return to ER with new or worsening symptoms. Patient states she is both understandable and agreeable to this plan. Plan of Care/Counseling:  TOM Buckley reviewed today's visit with the patient in addition to providing specific details for the plan of care and counseling regarding the diagnosis and prognosis. Questions are answered at this time and are agreeable with the plan. Assessment      1. Dizziness    2. Systolic murmur      This patient's ED course included: a personal history and physicial examination, re-evaluation prior to disposition, and complex medical decision making and emergency management  This patient has remained hemodynamically stable and been closely monitored during their ED course. Plan   Discharged home. Patient condition is good. New Medications     Discharge Medication List as of 7/20/2022  9:30 PM        Electronically signed by TOM Buckley   DD: 7/20/22  **This report was transcribed using voice recognition software. Every effort was made to ensure accuracy; however, inadvertent computerized transcription errors may be present.   Mike Odellh [place]  07/21/22 2002

## 2022-07-21 LAB
EKG ATRIAL RATE: 74 BPM
EKG P AXIS: 59 DEGREES
EKG P-R INTERVAL: 156 MS
EKG Q-T INTERVAL: 394 MS
EKG QRS DURATION: 104 MS
EKG QTC CALCULATION (BAZETT): 437 MS
EKG R AXIS: 21 DEGREES
EKG T AXIS: 49 DEGREES
EKG VENTRICULAR RATE: 74 BPM

## 2022-07-31 ENCOUNTER — HOSPITAL ENCOUNTER (EMERGENCY)
Age: 52
Discharge: HOME | End: 2022-07-31
Payer: COMMERCIAL

## 2022-07-31 VITALS
OXYGEN SATURATION: 97 % | RESPIRATION RATE: 18 BRPM | BODY MASS INDEX: 23.7 KG/M2 | HEIGHT: 72 IN | SYSTOLIC BLOOD PRESSURE: 103 MMHG | DIASTOLIC BLOOD PRESSURE: 70 MMHG | TEMPERATURE: 97.7 F | WEIGHT: 175 LBS | HEART RATE: 86 BPM

## 2022-07-31 DIAGNOSIS — L50.9 URTICARIA: Primary | ICD-10-CM

## 2022-07-31 PROCEDURE — 96372 THER/PROPH/DIAG INJ SC/IM: CPT

## 2022-07-31 PROCEDURE — 6360000002 HC RX W HCPCS: Performed by: PHYSICIAN ASSISTANT

## 2022-07-31 PROCEDURE — 99211 OFF/OP EST MAY X REQ PHY/QHP: CPT

## 2022-07-31 RX ORDER — PREDNISONE 20 MG/1
60 TABLET ORAL DAILY
Qty: 15 TABLET | Refills: 0 | Status: SHIPPED | OUTPATIENT
Start: 2022-07-31 | End: 2022-08-05

## 2022-07-31 RX ORDER — METHYLPREDNISOLONE SODIUM SUCCINATE 125 MG/2ML
60 INJECTION, POWDER, LYOPHILIZED, FOR SOLUTION INTRAMUSCULAR; INTRAVENOUS ONCE
Status: COMPLETED | OUTPATIENT
Start: 2022-07-31 | End: 2022-07-31

## 2022-07-31 RX ADMIN — METHYLPREDNISOLONE SODIUM SUCCINATE 60 MG: 125 INJECTION, POWDER, FOR SOLUTION INTRAMUSCULAR; INTRAVENOUS at 18:02

## 2022-07-31 ASSESSMENT — PAIN - FUNCTIONAL ASSESSMENT: PAIN_FUNCTIONAL_ASSESSMENT: NONE - DENIES PAIN

## 2022-07-31 ASSESSMENT — PAIN SCALES - GENERAL: PAINLEVEL_OUTOF10: 0

## 2022-07-31 NOTE — ED PROVIDER NOTES
HPI:  7/31/22, Time: 5:33 PM EDT         Jamie Manuel is a 50 y.o. female presenting to the ED for rash, beginning around 1500 this afternoon. The complaint has been persistent, moderate in severity, and worsened by nothing. Patient reports the rash is itchy but not painful. Rash is located to bilateral arms, bilateral legs back and abdomen. No difficulty breathing or swallowing. Cannot recall any new exposures recently. Afebrile without recent travel or sick contacts. Patient denies all other symptoms at this time. Review of Systems:   A complete review of systems was performed and pertinent positives and negatives are stated within HPI, all other systems reviewed and are negative.          --------------------------------------------- PAST HISTORY ---------------------------------------------  Past Medical History:  has a past medical history of Constipation and Diabetes mellitus ([facility] [address] Past Surgical History:  has a past surgical history that includes Hysterectomy and shoulder surgery (Left, 08/26/2019). Social History:  reports that she has never smoked. She has never used smokeless tobacco. She reports current alcohol use. She reports that she does not use drugs. Family History: family history is not on file. The patients home medications have been reviewed. Allergies: Patient has no known allergies. -------------------------------------------------- RESULTS -------------------------------------------------  All laboratory and radiology results have been personally reviewed by myself   LABS:  No results found for this visit on 07/31/22. RADIOLOGY:  Interpreted by Radiologist.  No orders to display       ------------------------- NURSING NOTES AND VITALS REVIEWED ---------------------------   The nursing notes within the ED encounter and vital signs as below have been reviewed.    /70   Pulse 86   Temp 97.7 °F (36.5 °C) (Infrared)   Resp 18   Ht 6' (1.829 m)   Wt 175 lb (79.4 kg)   SpO2 97%   BMI 23.73 kg/m²   Oxygen Saturation Interpretation: Normal      ---------------------------------------------------PHYSICAL EXAM--------------------------------------      Constitutional/General: Alert and oriented x3, well appearing, non toxic in NAD  Head: Normocephalic and atraumatic  Eyes: PERRL, EOMI  Mouth: Oropharynx clear, handling secretions, no trismus  Neck: Supple, full ROM,   Pulmonary: Lungs clear to auscultation bilaterally, no wheezes, rales, or rhonchi. Not in respiratory distress  Cardiovascular:  Regular rate and rhythm, no murmurs, gallops, or rubs. 2+ distal pulses  Extremities: Moves all extremities x 4. Warm and well perfused  Skin: warm and dry. Generalized urticarial rash noted to bilateral arms, legs, abdomen, and back. Neurologic: GCS 15,  Psych: Normal Affect      ------------------------------ ED COURSE/MEDICAL DECISION MAKING----------------------  Medications   methylPREDNISolone sodium (SOLU-MEDROL) injection 60 mg (60 mg IntraMUSCular Given 7/31/22 1802)         ED COURSE:       Medical Decision Making:    Patient is a 61-year-old female presenting to urgent care with an urticarial rash. Unknown new exposures. No respiratory symptoms. Patient is nontoxic-appearing, afebrile, no acute distress. Patient given a dose of IM steroids at urgent care today as well as a prescription for prednisone to go home with. Advised to follow-up very closely with PCP for recheck return the emergency department with any new or worsening symptoms. May take Benadryl over-the-counter as needed for the itching as well. Patient and  voiced understanding and are agreeable to the above treatment plan. Counseling: The emergency provider has spoken with the patient and spouse/SO and discussed todays results, in addition to providing specific details for the plan of care and counseling regarding the diagnosis and prognosis.   Questions are answered at this time and they are agreeable with the plan.      --------------------------------- IMPRESSION AND DISPOSITION ---------------------------------    IMPRESSION  1. Urticaria        DISPOSITION  Disposition: Discharge to home  Patient condition is stable      NOTE: This report was transcribed using voice recognition software.  Every effort was made to ensure accuracy; however, inadvertent computerized transcription errors may be present        Abeba Sinha, [place]  07/31/22 4593

## 2022-11-20 ENCOUNTER — HOSPITAL ENCOUNTER (EMERGENCY)
Age: 52
Discharge: HOME | End: 2022-11-20
Payer: COMMERCIAL

## 2022-11-20 VITALS
SYSTOLIC BLOOD PRESSURE: 110 MMHG | BODY MASS INDEX: 23.73 KG/M2 | OXYGEN SATURATION: 100 % | WEIGHT: 175 LBS | RESPIRATION RATE: 16 BRPM | DIASTOLIC BLOOD PRESSURE: 73 MMHG | TEMPERATURE: 98.1 F | HEART RATE: 68 BPM

## 2022-11-20 DIAGNOSIS — N30.00 ACUTE CYSTITIS WITHOUT HEMATURIA: Primary | ICD-10-CM

## 2022-11-20 DIAGNOSIS — B37.49 CANDIDAL UTI (URINARY TRACT INFECTION): ICD-10-CM

## 2022-11-20 LAB
BACTERIA: ABNORMAL /HPF
BILIRUBIN URINE: NEGATIVE
BLOOD, URINE: NEGATIVE
CLARITY: ABNORMAL
COLOR: YELLOW
EPITHELIAL CELLS, UA: ABNORMAL /HPF
GLUCOSE URINE: 500 MG/DL
KETONES, URINE: ABNORMAL MG/DL
LEUKOCYTE ESTERASE, URINE: NEGATIVE
NITRITE, URINE: NEGATIVE
PH UA: 5 (ref 5–9)
PROTEIN UA: NEGATIVE MG/DL
RBC UA: ABNORMAL /HPF (ref 0–2)
SPECIFIC GRAVITY UA: 1.02 (ref 1–1.03)
UROBILINOGEN, URINE: 0.2 E.U./DL
WBC UA: ABNORMAL /HPF (ref 0–5)
YEAST: PRESENT /HPF

## 2022-11-20 PROCEDURE — 99211 OFF/OP EST MAY X REQ PHY/QHP: CPT

## 2022-11-20 PROCEDURE — 81001 URINALYSIS AUTO W/SCOPE: CPT

## 2022-11-20 RX ORDER — PHENAZOPYRIDINE HYDROCHLORIDE 200 MG/1
200 TABLET, FILM COATED ORAL 3 TIMES DAILY PRN
Qty: 6 TABLET | Refills: 0 | Status: SHIPPED | OUTPATIENT
Start: 2022-11-20 | End: 2022-11-22

## 2022-11-20 RX ORDER — FLUCONAZOLE 150 MG/1
150 TABLET ORAL
Qty: 3 TABLET | Refills: 0 | Status: SHIPPED | OUTPATIENT
Start: 2022-11-20

## 2022-11-20 RX ORDER — CIPROFLOXACIN 500 MG/1
500 TABLET, FILM COATED ORAL 2 TIMES DAILY
Qty: 14 TABLET | Refills: 0 | Status: SHIPPED | OUTPATIENT
Start: 2022-11-20 | End: 2022-11-27

## 2022-11-20 ASSESSMENT — PAIN - FUNCTIONAL ASSESSMENT: PAIN_FUNCTIONAL_ASSESSMENT: NONE - DENIES PAIN

## 2022-11-20 NOTE — ED PROVIDER NOTES
[address] [facility]  Department of Emergency Medicine  UC Encounter Note  22   8:14 AM EST      NAME: Myke Mancilla  :  1970  MRN:  [id number]    Chief Complaint: Urinary Tract Infection (Burning, started last night)      This is a 28-year-old female the presents to urgent care complaining of urinary symptoms for the past 2 days. Mostly complains of some burning with urination. She denies any back pain fevers or chills. She does have history of urinary tract infections. On first contact patient she appears to be in no acute distress. Review of Systems  Pertinent positives and negatives are stated within HPI, all other systems reviewed and are negative. Physical Exam  Vitals and nursing note reviewed. Constitutional:       Appearance: She is well-developed. HENT:      Head: Normocephalic and atraumatic. Right Ear: Hearing and external ear normal.      Left Ear: Hearing and external ear normal.      Nose: Nose normal.      Mouth/Throat:      Pharynx: Uvula midline. Eyes:      General: Lids are normal.      Conjunctiva/sclera: Conjunctivae normal.      Pupils: Pupils are equal, round, and reactive to light. Cardiovascular:      Rate and Rhythm: Normal rate and regular rhythm. Heart sounds: Normal heart sounds. No murmur heard. Pulmonary:      Effort: Pulmonary effort is normal.      Breath sounds: Normal breath sounds. Abdominal:      General: Bowel sounds are normal.      Palpations: Abdomen is soft. Abdomen is not rigid. Tenderness: There is no abdominal tenderness. There is no right CVA tenderness, left CVA tenderness, guarding or rebound. Musculoskeletal:      Cervical back: Normal range of motion and neck supple. Skin:     General: Skin is warm and dry. Findings: No abrasion or rash. Neurological:      General: No focal deficit present. Mental Status: She is alert and oriented to person, place, and time.       GCS: GCS eye subscore is 4. GCS verbal subscore is 5. GCS motor subscore is 6. Cranial Nerves: No cranial nerve deficit. Sensory: No sensory deficit. Coordination: Coordination normal.      Gait: Gait normal.       Procedures    MDM  Number of Diagnoses or Management Options  Acute cystitis without hematuria  Candidal UTI (urinary tract infection)  Diagnosis management comments: No acute distress. She is not septic. She has urinary symptoms. I will send urine out for culture in the meantime I will place her on antibiotic. She is having yeast in the urine she is diabetic I will treat the yeast infection. Instructions given. Return if symptoms worsen. --------------------------------------------- PAST HISTORY ---------------------------------------------  Past Medical History:  has a past medical history of Constipation and Diabetes mellitus ([facility] [address] Past Surgical History:  has a past surgical history that includes Hysterectomy and shoulder surgery (Left, 08/26/2019). Social History:  reports that she has never smoked. She has never used smokeless tobacco. She reports current alcohol use. She reports that she does not use drugs. Family History: family history is not on file. The patients home medications have been reviewed. Allergies: Patient has no known allergies.     -------------------------------------------------- RESULTS -------------------------------------------------  Results for orders placed or performed during the hospital encounter of 11/20/22   Urinalysis   Result Value Ref Range    Color, UA Yellow Straw/Yellow    Clarity, UA SL CLOUDY Clear    Glucose, Ur 500 (A) Negative mg/dL    Bilirubin Urine Negative Negative    Ketones, Urine TRACE (A) Negative mg/dL    Specific Gravity, UA 1.025 1.005 - 1.030    Blood, Urine Negative Negative    pH, UA 5.0 5.0 - 9.0    Protein, UA Negative Negative mg/dL    Urobilinogen, Urine 0.2 <2.0 E.U./dL    Nitrite, Urine Negative Negative    Leukocyte Esterase, Urine Negative Negative   Microscopic Urinalysis   Result Value Ref Range    WBC, UA 0-1 0 - 5 /HPF    RBC, UA 0-1 0 - 2 /HPF    Epithelial Cells, UA MODERATE /HPF    Bacteria, UA RARE (A) None Seen /HPF    Yeast, UA Present (A) None Seen /HPF     No orders to display       ------------------------- NURSING NOTES AND VITALS REVIEWED ---------------------------   The nursing notes within the ED encounter and vital signs as below have been reviewed. /73   Pulse 68   Temp 98.1 °F (36.7 °C)   Resp 16   Wt 175 lb (79.4 kg)   SpO2 100%   BMI 23.73 kg/m²   Oxygen Saturation Interpretation: Normal      ------------------------------------------ PROGRESS NOTES ------------------------------------------   I have spoken with the patient and discussed todays results, in addition to providing specific details for the plan of care and counseling regarding the diagnosis and prognosis. Their questions are answered at this time and they are agreeable with the plan.      --------------------------------- ADDITIONAL PROVIDER NOTES ---------------------------------     This patient is stable for discharge. I have shared the specific conditions for return, as well as the importance of follow-up. * NOTE: This report was transcribed using voice recognition software. Every effort was made to ensure accuracy; however, inadvertent computerized transcription errors may be present.    --------------------------------- IMPRESSION AND DISPOSITION ---------------------------------    IMPRESSION  1. Acute cystitis without hematuria    2.  Candidal UTI (urinary tract infection)        DISPOSITION  Disposition: Discharge to home  Patient condition is good       Javier Ring PA-C  11/20/22 [id number]

## 2023-09-02 ENCOUNTER — HOSPITAL ENCOUNTER (OUTPATIENT)
Age: 53
End: 2023-09-02
Payer: COMMERCIAL

## 2023-09-02 ENCOUNTER — HOSPITAL ENCOUNTER (OUTPATIENT)
Age: 53
Discharge: HOME | End: 2023-09-02
Payer: COMMERCIAL

## 2023-09-02 ENCOUNTER — HOSPITAL ENCOUNTER (OUTPATIENT)
Dept: GENERAL RADIOLOGY | Age: 53
End: 2023-09-02
Payer: COMMERCIAL

## 2023-09-02 DIAGNOSIS — R63.4 ABNORMAL WEIGHT LOSS: ICD-10-CM

## 2023-09-02 LAB
ALBUMIN SERPL-MCNC: 4.4 G/DL (ref 3.5–5.2)
ALP SERPL-CCNC: 77 U/L (ref 35–104)
ALT SERPL-CCNC: 23 U/L (ref 0–32)
ANION GAP SERPL CALCULATED.3IONS-SCNC: 12 MMOL/L (ref 7–16)
AST SERPL-CCNC: 18 U/L (ref 0–31)
BILIRUB SERPL-MCNC: 0.5 MG/DL (ref 0–1.2)
BUN SERPL-MCNC: 22 MG/DL (ref 6–20)
CALCIUM SERPL-MCNC: 9.4 MG/DL (ref 8.6–10.2)
CHLORIDE SERPL-SCNC: 103 MMOL/L (ref 98–107)
CO2 SERPL-SCNC: 23 MMOL/L (ref 22–29)
CREAT SERPL-MCNC: 0.9 MG/DL (ref 0.5–1)
GFR SERPL CREATININE-BSD FRML MDRD: >60 ML/MIN/1.73M2
GLUCOSE P FAST SERPL-MCNC: 181 MG/DL (ref 74–99)
LIPASE SERPL-CCNC: 85 U/L (ref 13–60)
POTASSIUM SERPL-SCNC: 4.5 MMOL/L (ref 3.5–5)
PREALB SERPL-MCNC: 28 MG/DL (ref 20–40)
PROT SERPL-MCNC: 7.1 G/DL (ref 6.4–8.3)
SODIUM SERPL-SCNC: 138 MMOL/L (ref 132–146)
TSH SERPL DL<=0.05 MIU/L-ACNC: 1.97 UIU/ML (ref 0.27–4.2)

## 2023-09-02 PROCEDURE — 86200 CCP ANTIBODY: CPT

## 2023-09-02 PROCEDURE — 84134 ASSAY OF PREALBUMIN: CPT

## 2023-09-02 PROCEDURE — 83525 ASSAY OF INSULIN: CPT

## 2023-09-02 PROCEDURE — 84443 ASSAY THYROID STIM HORMONE: CPT

## 2023-09-02 PROCEDURE — 36415 COLL VENOUS BLD VENIPUNCTURE: CPT

## 2023-09-02 PROCEDURE — 71046 X-RAY EXAM CHEST 2 VIEWS: CPT

## 2023-09-02 PROCEDURE — 83690 ASSAY OF LIPASE: CPT

## 2023-09-02 PROCEDURE — 84439 ASSAY OF FREE THYROXINE: CPT

## 2023-09-02 PROCEDURE — 80053 COMPREHEN METABOLIC PANEL: CPT

## 2023-09-03 LAB — T4 FREE SERPL-MCNC: 1.2 NG/DL (ref 0.9–1.7)

## 2023-09-06 LAB
INSULIN COMMENT: NORMAL
INSULIN REFERENCE RANGE:: NORMAL
INSULIN: 3 MU/L

## 2023-09-08 LAB — CCP AB SER IA-ACNC: 1 U/ML (ref 0–7)

## 2024-02-04 ENCOUNTER — HOSPITAL ENCOUNTER (EMERGENCY)
Age: 54
Discharge: HOME | End: 2024-02-04
Payer: COMMERCIAL

## 2024-02-04 VITALS
SYSTOLIC BLOOD PRESSURE: 118 MMHG | RESPIRATION RATE: 20 BRPM | DIASTOLIC BLOOD PRESSURE: 74 MMHG | BODY MASS INDEX: 23.73 KG/M2 | HEART RATE: 87 BPM | TEMPERATURE: 98.1 F | OXYGEN SATURATION: 97 % | WEIGHT: 175 LBS

## 2024-02-04 DIAGNOSIS — J02.9 ACUTE PHARYNGITIS, UNSPECIFIED ETIOLOGY: Primary | ICD-10-CM

## 2024-02-04 DIAGNOSIS — H10.9 CONJUNCTIVITIS OF BOTH EYES, UNSPECIFIED CONJUNCTIVITIS TYPE: ICD-10-CM

## 2024-02-04 PROCEDURE — 99211 OFF/OP EST MAY X REQ PHY/QHP: CPT

## 2024-02-04 RX ORDER — AZITHROMYCIN 250 MG/1
TABLET, FILM COATED ORAL
Qty: 1 PACKET | Refills: 0 | Status: SHIPPED | OUTPATIENT
Start: 2024-02-04 | End: 2024-02-08

## 2024-02-04 RX ORDER — CIPROFLOXACIN HYDROCHLORIDE 3.5 MG/ML
1 SOLUTION/ DROPS TOPICAL
Qty: 10 ML | Refills: 0 | Status: SHIPPED | OUTPATIENT
Start: 2024-02-04 | End: 2024-02-09

## 2024-02-04 ASSESSMENT — PAIN - FUNCTIONAL ASSESSMENT: PAIN_FUNCTIONAL_ASSESSMENT: 0-10

## 2024-02-04 ASSESSMENT — PAIN DESCRIPTION - DESCRIPTORS: DESCRIPTORS: SORE

## 2024-02-04 ASSESSMENT — PAIN DESCRIPTION - LOCATION: LOCATION: THROAT

## 2024-02-05 NOTE — ED PROVIDER NOTES
not hypoxic.  Patient is well appearing, non toxic, meets criteria for and is appropriate for outpatient management.  Patient advised to discontinue use of previously prescribed neomycin drops will add ciprofloxacin as patient is a contact wearer and this provides Pseudomonas coverage.  Patient advised to follow-up with PCP for re-evaluation as needed and was educated on signs symptoms which require emergent re-evaluation.    Assessment     1. Acute pharyngitis, unspecified etiology    2. Conjunctivitis of both eyes, unspecified conjunctivitis type      Plan   Discharged home.  Patient condition is good    New Medications     Discharge Medication List as of 2/4/2024  7:27 PM        START taking these medications    Details   azithromycin (ZITHROMAX Z-ANAMIKA) 250 MG tablet Take 2 tablets (500 mg) on Day 1, and then take 1 tablet (250 mg) on days 2 through 5., Disp-1 packet, R-0Normal      ciprofloxacin (CILOXAN) 0.3 % ophthalmic solution Place 1 drop into both eyes every 2 hours for 5 days, Disp-10 mL, R-0Normal           Electronically signed by SHARLENE Hurd CNP   DD: 2/4/24  **This report was transcribed using voice recognition software. Every effort was made to ensure accuracy; however, inadvertent computerized transcription errors may be present.  END OF ED PROVIDER NOTE        Josefa Fontenot APRN - CNP  02/04/24 [id number]

## 2024-06-04 ENCOUNTER — HOSPITAL ENCOUNTER (OUTPATIENT)
Dept: DIABETES SERVICES | Age: 54
Setting detail: THERAPIES SERIES
Discharge: HOME | End: 2024-06-04
Payer: COMMERCIAL

## 2024-06-04 PROCEDURE — G0109 DIAB MANAGE TRN IND/GROUP: HCPCS

## 2024-06-04 SDOH — ECONOMIC STABILITY: FOOD INSECURITY: ADDITIONAL INFORMATION: NO

## 2024-06-04 ASSESSMENT — PROBLEM AREAS IN DIABETES QUESTIONNAIRE (PAID)
PAID-5 TOTAL SCORE: 6
WORRYING ABOUT THE FUTURE AND THE POSSIBILITY OF SERIOUS COMPLICATIONS: MODERATE PROBLEM
FEELING SCARED WHEN YOU THINK ABOUT LIVING WITH DIABETES: MINOR PROBLEM
FEELING DEPRESSED WHEN YOU THINK ABOUT LIVING WITH DIABETES: NOT A PROBLEM
FEELING THAT DIABETES IS TAKING UP TOO MUCH OF YOUR MENTAL AND PHYSICAL ENERGY EVERY DAY: MINOR PROBLEM
COPING WITH COMPLICATIONS OF DIABETES: MODERATE PROBLEM

## 2024-06-04 NOTE — PROGRESS NOTES
Diabetes Self-Management Education Record    Participant Name: Mervat Key  Referring Provider: Ayaz Gibbs DO  Assessment/Evaluation Ratings:  1=Needs Instruction   4=Demonstrates Understanding/Competency  2=Needs Review   NC=Not Covered    3=Comprehends Key Points  N/A=Not Applicable  Topics/Learning Objectives Pre-session Assess Date:  Instructor initials/date  6/4/24 KR Instruction provided     Yearly follow-up/  reinforcement date. Post- session Eval Comments   Diabetes disease process & Treatment process:   -Define type of diabetes in simple terms.  - Describe the ABCs of  diabetes management  -Identify own type of diabetes  -Identify lifestyle changes/treatment options  -other:  1 [] All     []  []  []  []  []      Developing strategies for Healthy coping/psychosocial issues:    -Describe feelings about living with diabetes  -Identify coping strategies and sources of stress  -Identify support needed & support network available  -Complete PAID-5 Diabetes questionnaire 1 [] All     []  []  []    []   Date:6/4/24  PAID-5 Score:6  Confidence Score:6            Prevention, detection & treatment of Chronic complications:    -Identify the prevention, detection and treatment for complications including immunizations, preventive eye, foot, dental and renal exams as indicated per the participant's duration of diabetes and health status.  -Define the natural course of diabetes and the relationship of blood glucose levels to long term complications of diabetes.  1 [] All     []            []      Prevention, detection & treatment of acute complications:    -State the causes,signs & symptoms of hyper & hypoglycemia, and prevention & treatment strategies.   -Describe sick day guidelines  DKA /indications for ketone testing &  when to call physician  1 [] All     []      []                  -Identify severe weather/situation crisis  & diabetes supplies management  []      Using medications safely:   -State 
Method  -Verbalized ability to measure and count carbohydrate gram servings using food labels and carbohydrate food list.    -Plan a carbohydrate-controlled meal based on individual needs/preferences from a Registered Dietitian.   1 [] All       []    []    []  []      []        []                      Developing strategies for problem solving to promote health/change behavior.  -Identify 7 self-care behaviors; Personal health risk factors; Benefits, challenges & strategies for behavioral change and set an individualized goal selection. 1       []      []Nutrition  []Monitoring  []Exercise  []Medication  []Other     Identified Barriers to learning/adherence to self management plan:    None      Instruction Method:  Lecture/Discussion, Power Point Presentation , Handouts,     Supporting Education Materials/Equipment Provided: Self-management manual and Nutritional Packet       Encounter Type Date Attended Start Time End Time Comments  DSMES/MNT units charged Next appointment:   Assessment           Session 1 6/4/24 KR 0900 1130   5    Session 2         DSMES Meal plan RDN          Session # 3 RN          Session #3 RDN          DSMES individual appnt RN/RDN         Meter Instrx         Insulin Instrx          GDM individual         GDM group         MNT individual initial         MNT individual f/u         Yearly DSMES f/u           Additional Comments:

## 2024-06-05 ENCOUNTER — HOSPITAL ENCOUNTER (OUTPATIENT)
Dept: DIABETES SERVICES | Age: 54
Setting detail: THERAPIES SERIES
Discharge: HOME | End: 2024-06-05
Payer: COMMERCIAL

## 2024-06-05 PROCEDURE — G0109 DIAB MANAGE TRN IND/GROUP: HCPCS

## 2024-06-06 NOTE — PROGRESS NOTES
guidelines  DKA /indications for ketone testing &  when to call physician  1 [x] All KR 6/4/24    []      []    4 KR 6/4/24  Was hospitalized with DKA in the past 12 months. Hypoglycemic readings 6% of time on CGM.              -Identify severe weather/situation crisis  & diabetes supplies management  []      Using medications safely:   -State effects of diabetes medicines on blood glucose levels;  -List diabetes medication taken, action & side effects 1 [x] All KR 6/4/24    []  []  4 KR 6/4/24  Hx taking Metformin/ Jardiance    Insulin/Injectables/glucagon  -Name appropriate injection sites; proper storage; supplies needed;  1   [x]KR 6/4/24  4 KR 6/4/24  Takes Lantus and Novolog.    Demonstrate proper technique N/A - KR 6/4/24 []      Monitoring blood glucose, interpreting and using results:   -Identify the purpose of testing   -Identify recommended & personal blood glucose targets & HgbA1C target levels  -State the Importance of logging blood glucose levels for pattern recognition;   -State benefits of reading/using pt generated health data  -Verbalize safe lancet disposal/CGM 2 [x] All  6/4/24    []  []    []  []  []  4 KR 6/4/24  Has CGM. Checks 4 times daily before meals.    -Demonstrate proper testing technique N/A - KR 6/4/24 []      Incorporating physical activity into lifestyle:   -State effect of exercise on blood glucose levels;   -State benefits of regular exercise;   -Define safety considerations/food choices if needed.  -Describe contraindications/maintenance of activity. 2 [x] All KR 6/4/24    []  []    []  []  4 KR 6/4/24  Does aerobics/ walking 5 days per week for 30 minutes.    Incorporating nutritional management into lifestyle:   -Describe effect of type, amount & timing of food on blood glucose  -Describe methods for preparing and planning   healthy meals  -Correctly read food labels for nutritional values  -Name 3 foods high in Carbohydrate  -Plan a sample 4 carbohydrate-controlled meal

## 2024-06-20 ENCOUNTER — HOSPITAL ENCOUNTER (OUTPATIENT)
Dept: GENERAL RADIOLOGY | Age: 54
Discharge: HOME | End: 2024-06-22
Payer: COMMERCIAL

## 2024-06-20 ENCOUNTER — HOSPITAL ENCOUNTER (OUTPATIENT)
Age: 54
Discharge: HOME | End: 2024-06-22
Payer: COMMERCIAL

## 2024-06-20 DIAGNOSIS — G89.29 CHRONIC LOW BACK PAIN, UNSPECIFIED BACK PAIN LATERALITY, UNSPECIFIED WHETHER SCIATICA PRESENT: ICD-10-CM

## 2024-06-20 DIAGNOSIS — M25.572 PAIN OF JOINT OF LEFT ANKLE AND FOOT: ICD-10-CM

## 2024-06-20 DIAGNOSIS — M25.571 PAIN IN JOINT INVOLVING RIGHT ANKLE AND FOOT: ICD-10-CM

## 2024-06-20 DIAGNOSIS — M25.561 PAIN IN BOTH KNEES, UNSPECIFIED CHRONICITY: ICD-10-CM

## 2024-06-20 DIAGNOSIS — M25.562 PAIN IN BOTH KNEES, UNSPECIFIED CHRONICITY: ICD-10-CM

## 2024-06-20 DIAGNOSIS — M54.50 CHRONIC LOW BACK PAIN, UNSPECIFIED BACK PAIN LATERALITY, UNSPECIFIED WHETHER SCIATICA PRESENT: ICD-10-CM

## 2024-06-20 PROCEDURE — 73600 X-RAY EXAM OF ANKLE: CPT

## 2024-06-20 PROCEDURE — 72100 X-RAY EXAM L-S SPINE 2/3 VWS: CPT

## 2024-06-20 PROCEDURE — 73620 X-RAY EXAM OF FOOT: CPT

## 2024-06-20 PROCEDURE — 73565 X-RAY EXAM OF KNEES: CPT

## 2024-09-25 ENCOUNTER — FOLLOWUP TELEPHONE ENCOUNTER (OUTPATIENT)
Dept: DIABETES SERVICES | Age: 54
End: 2024-09-25

## 2025-01-15 ENCOUNTER — HOSPITAL ENCOUNTER (OUTPATIENT)
Age: 55
Discharge: HOME | End: 2025-01-15
Payer: COMMERCIAL

## 2025-01-15 LAB
ALBUMIN SERPL-MCNC: 4.3 G/DL (ref 3.5–5.2)
ALP SERPL-CCNC: 85 U/L (ref 35–104)
ALT SERPL-CCNC: 18 U/L (ref 0–32)
ANION GAP SERPL CALCULATED.3IONS-SCNC: 8 MMOL/L (ref 7–16)
AST SERPL-CCNC: 22 U/L (ref 0–31)
BILIRUB SERPL-MCNC: 1 MG/DL (ref 0–1.2)
BUN SERPL-MCNC: 22 MG/DL (ref 6–20)
CALCIUM SERPL-MCNC: 9.2 MG/DL (ref 8.6–10.2)
CHLORIDE SERPL-SCNC: 103 MMOL/L (ref 98–107)
CHOLEST SERPL-MCNC: 203 MG/DL
CO2 SERPL-SCNC: 26 MMOL/L (ref 22–29)
CREAT SERPL-MCNC: 1.1 MG/DL (ref 0.5–1)
CREAT UR-MCNC: 103.3 MG/DL (ref 29–226)
GFR, ESTIMATED: 61 ML/MIN/1.73M2
GLUCOSE P FAST SERPL-MCNC: 155 MG/DL (ref 74–99)
HBA1C MFR BLD: 6.6 % (ref 4–5.6)
HDLC SERPL-MCNC: 91 MG/DL
LDLC SERPL CALC-MCNC: 98 MG/DL
MICROALBUMIN UR-MCNC: 128 MG/L (ref 0–19)
MICROALBUMIN/CREAT UR-RTO: 123 MCG/MG CREAT (ref 0–30)
POTASSIUM SERPL-SCNC: 4.1 MMOL/L (ref 3.5–5)
PROT SERPL-MCNC: 7.3 G/DL (ref 6.4–8.3)
SODIUM SERPL-SCNC: 137 MMOL/L (ref 132–146)
TRIGL SERPL-MCNC: 69 MG/DL
TSH SERPL DL<=0.05 MIU/L-ACNC: 1.32 UIU/ML (ref 0.27–4.2)
VLDLC SERPL CALC-MCNC: 14 MG/DL

## 2025-01-15 PROCEDURE — 83036 HEMOGLOBIN GLYCOSYLATED A1C: CPT

## 2025-01-15 PROCEDURE — 80061 LIPID PANEL: CPT

## 2025-01-15 PROCEDURE — 80053 COMPREHEN METABOLIC PANEL: CPT

## 2025-01-15 PROCEDURE — 82570 ASSAY OF URINE CREATININE: CPT

## 2025-01-15 PROCEDURE — 84443 ASSAY THYROID STIM HORMONE: CPT

## 2025-01-15 PROCEDURE — 82043 UR ALBUMIN QUANTITATIVE: CPT

## 2025-01-15 PROCEDURE — 36415 COLL VENOUS BLD VENIPUNCTURE: CPT

## 2025-02-16 ENCOUNTER — HOSPITAL ENCOUNTER (EMERGENCY)
Age: 55
Discharge: HOME | End: 2025-02-16
Payer: COMMERCIAL

## 2025-02-16 VITALS
TEMPERATURE: 98.2 F | RESPIRATION RATE: 18 BRPM | BODY MASS INDEX: 23.73 KG/M2 | SYSTOLIC BLOOD PRESSURE: 105 MMHG | WEIGHT: 175 LBS | HEART RATE: 78 BPM | DIASTOLIC BLOOD PRESSURE: 73 MMHG | OXYGEN SATURATION: 99 %

## 2025-02-16 DIAGNOSIS — J02.0 STREP PHARYNGITIS: Primary | ICD-10-CM

## 2025-02-16 LAB
FLUAV RNA RESP QL NAA+PROBE: NOT DETECTED
FLUBV RNA RESP QL NAA+PROBE: NOT DETECTED
SARS-COV-2 RNA RESP QL NAA+PROBE: NOT DETECTED
SOURCE: NORMAL
SPECIMEN DESCRIPTION: NORMAL
SPECIMEN SOURCE: ABNORMAL
STREP A, MOLECULAR: POSITIVE

## 2025-02-16 PROCEDURE — 87651 STREP A DNA AMP PROBE: CPT

## 2025-02-16 PROCEDURE — 99211 OFF/OP EST MAY X REQ PHY/QHP: CPT

## 2025-02-16 PROCEDURE — 87636 SARSCOV2 & INF A&B AMP PRB: CPT

## 2025-02-16 ASSESSMENT — PAIN - FUNCTIONAL ASSESSMENT: PAIN_FUNCTIONAL_ASSESSMENT: NONE - DENIES PAIN

## 2025-02-16 NOTE — ED PROVIDER NOTES
Independent ANTONY Visit.    HPI:  2/16/25,   Time: 8:48 AM MARTHA Key is a 55 y.o. female presenting to the ED for headache body aches and sore throat.  Started 2 days ago.  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  Her symptoms have remained constant and not worsening.  She has taken Tylenol for symptoms with minimal relief.  Nothing makes it better or worse.  She denies fever, chills, facial swelling, trismus, drooling, chest pain, shortness of breath, cough, abdominal pain, nausea vomiting or diarrhea.  ROS:   Pertinent positives and negatives are stated within HPI, all other systems reviewed and are negative.  --------------------------------------------- PAST HISTORY ---------------------------------------------  Past Medical History:  has a past medical history of Constipation and Diabetes mellitus (HCC).    Past Surgical History:  has a past surgical history that includes Hysterectomy and shoulder surgery (Left, 08/26/2019).    Social History:  reports that she has never smoked. She has never used smokeless tobacco. She reports current alcohol use. She reports that she does not use drugs.    Family History: family history is not on file.     The patient’s home medications have been reviewed.    Allergies: Patient has no known allergies.    -------------------------------------------------- RESULTS -------------------------------------------------  All laboratory and radiology results have been personally reviewed by myself   LABS:  Results for orders placed or performed during the hospital encounter of 02/16/25   COVID-19 & Influenza Combo    Specimen: Nasopharyngeal Swab   Result Value Ref Range    Specimen Description .NASOPHARYNGEAL SWAB     Source .NASOPHARYNGEAL SWAB     SARS-CoV-2 RNA, RT PCR Not Detected Not Detected    Influenza A Not Detected Not Detected    Influenza B Not Detected Not Detected   Rapid Strep Screen    Specimen: Throat   Result Value Ref

## 2025-03-16 ENCOUNTER — HOSPITAL ENCOUNTER (EMERGENCY)
Age: 55
Discharge: HOME | End: 2025-03-16
Payer: COMMERCIAL

## 2025-03-16 VITALS
SYSTOLIC BLOOD PRESSURE: 121 MMHG | RESPIRATION RATE: 18 BRPM | OXYGEN SATURATION: 99 % | WEIGHT: 180 LBS | HEART RATE: 78 BPM | BODY MASS INDEX: 24.41 KG/M2 | DIASTOLIC BLOOD PRESSURE: 75 MMHG | TEMPERATURE: 98.4 F

## 2025-03-16 DIAGNOSIS — J02.0 STREPTOCOCCAL SORE THROAT: Primary | ICD-10-CM

## 2025-03-16 LAB
SPECIMEN SOURCE: ABNORMAL
STREP A, MOLECULAR: POSITIVE

## 2025-03-16 PROCEDURE — 99211 OFF/OP EST MAY X REQ PHY/QHP: CPT

## 2025-03-16 PROCEDURE — 87651 STREP A DNA AMP PROBE: CPT

## 2025-03-16 RX ORDER — ACETAMINOPHEN 500 MG
1000 TABLET ORAL EVERY 8 HOURS PRN
Qty: 30 TABLET | Refills: 0 | Status: SHIPPED | OUTPATIENT
Start: 2025-03-16

## 2025-03-16 RX ORDER — CEFDINIR 300 MG/1
300 CAPSULE ORAL 2 TIMES DAILY
Qty: 20 CAPSULE | Refills: 0 | Status: SHIPPED | OUTPATIENT
Start: 2025-03-16 | End: 2025-03-26

## 2025-03-16 RX ORDER — IBUPROFEN 600 MG/1
600 TABLET, FILM COATED ORAL EVERY 8 HOURS PRN
Qty: 30 TABLET | Refills: 0 | Status: SHIPPED | OUTPATIENT
Start: 2025-03-16

## 2025-03-16 ASSESSMENT — PAIN - FUNCTIONAL ASSESSMENT: PAIN_FUNCTIONAL_ASSESSMENT: NONE - DENIES PAIN

## 2025-03-16 NOTE — ED PROVIDER NOTES
Department of Emergency Medicine  ED  Note  Admit Date/RoomTime: 3/16/2025  4:55 PM  ED Room: 06/06      Chief Complaint:  Pharyngitis (Sore throat, body aches, seen last month for strep, thinks it is back)      History of Present Illness:  Source of history provided by:  patient.  History/Exam Limitations: none.     Mervat Key is a 55 y.o. old female presenting to urgent care for sore throat pain, which occured a few day(s) prior to arrival.  Since onset the symptoms have been stable.           Exposed To:  Streptococcus: yes.                              Infectious Mononucleosis:  unknown.        Symptoms:  Pain:  Yes.                            Muffled Voice:  No.                            Hoarse:  Yes.                            Difficulty with Secretions:  No.       Associated Signs & Symptoms: body aches, chills, and fatigue.  Was seen and treated here approximate 1 month ago with Augmentin p.o. twice daily x 10 days.  States she completed this however began having symptoms again near the end of the course of the antibiotics.    Review of Systems:   Pertinent positives and negatives are stated within HPI, all other systems reviewed and are negative.        Past Medical History:  has a past medical history of Constipation and Diabetes mellitus (HCC).  Past Surgical History:  has a past surgical history that includes Hysterectomy and shoulder surgery (Left, 08/26/2019).  Social History:  reports that she has never smoked. She has never used smokeless tobacco. She reports current alcohol use. She reports that she does not use drugs.  Family History: family history is not on file.   Allergies: Patient has no known allergies.    Physical Exam:  Vital signs reviewed.  Constitutional:  Alert, development consistent with age. Well appearing and non toxic and not distressed.  Ears:  TMs without perforation, injection, or bulging.  External canals clear without exudate.  Mouth/Throat: Airway Patent. Floor of mouth